# Patient Record
Sex: MALE | Race: OTHER | Employment: FULL TIME | URBAN - METROPOLITAN AREA
[De-identification: names, ages, dates, MRNs, and addresses within clinical notes are randomized per-mention and may not be internally consistent; named-entity substitution may affect disease eponyms.]

---

## 2017-10-17 ENCOUNTER — OFFICE VISIT (OUTPATIENT)
Dept: FAMILY MEDICINE CLINIC | Facility: CLINIC | Age: 27
End: 2017-10-17

## 2017-10-17 VITALS
HEART RATE: 83 BPM | SYSTOLIC BLOOD PRESSURE: 110 MMHG | WEIGHT: 201 LBS | BODY MASS INDEX: 27.22 KG/M2 | RESPIRATION RATE: 20 BRPM | DIASTOLIC BLOOD PRESSURE: 80 MMHG | HEIGHT: 72 IN | OXYGEN SATURATION: 99 % | TEMPERATURE: 98 F

## 2017-10-17 DIAGNOSIS — L30.9 DERMATITIS: Primary | ICD-10-CM

## 2017-10-17 PROCEDURE — 99212 OFFICE O/P EST SF 10 MIN: CPT | Performed by: NURSE PRACTITIONER

## 2017-10-17 RX ORDER — METHYLPREDNISOLONE 4 MG/1
TABLET ORAL
Qty: 1 KIT | Refills: 0 | Status: SHIPPED | OUTPATIENT
Start: 2017-10-17 | End: 2017-10-28 | Stop reason: ALTCHOICE

## 2017-10-17 NOTE — PROGRESS NOTES
CHIEF COMPLAINT:   Patient presents with:  Derm Problem: rash on RT knee x 2 days, rash is red and itchy         HPI:   John Villarreal is a 24year old male who presents for evaluation of a rash. Per patient rash started in the past 2 days.  Rash has been LUNGS: Clear to auscultation bilaterally. No wheezing, rhonchi, or rales. No diminished breath sounds. No increased work of breathing. CARDIO: RRR without murmur  JOINTS: normal ROM of all extremities  LYMPH: No cervical lymphadenopathy.      ASSESSMENT · Do your best not to scratch. Clip fingernails short, especially in young children, to reduce skin damage if scratching does occur. · Use moisturizing skin lotion instead of scratching your dry skin. · Use sunscreen whenever going out into direct sun. · You were told that you have resistant bacteria (MRSA) on your skin. Date Last Reviewed: 5/12/2015  © 2253-7669 [employer]. [address] [facility] [place], [address] [place] [address]. All rights reserved.  This information is not intended as a substitute f

## 2017-10-17 NOTE — PATIENT INSTRUCTIONS
Benadryl at night  Follow up for worsening symptoms or no improvement    Self-Care for Skin Rashes     Pat your skin dry. Do not rub. When your skin reacts to a substance your body is sensitive to, it can cause a rash.  You can treat most rashes at [facility] · Using over-the-counter hydrocortisone cream on small rashes may help reduce swelling and itching  · Most over-the-counter antifungal medicines can treat athlete’s foot and many other fungal infections of the skin.   Check with your healthcare provider  Ca

## 2017-10-26 ENCOUNTER — HOSPITAL ENCOUNTER (OUTPATIENT)
Age: 27
Discharge: HOME | End: 2017-10-26
Payer: COMMERCIAL

## 2017-10-26 VITALS
OXYGEN SATURATION: 98 % | HEART RATE: 90 BPM | TEMPERATURE: 98 F | DIASTOLIC BLOOD PRESSURE: 81 MMHG | RESPIRATION RATE: 18 BRPM | SYSTOLIC BLOOD PRESSURE: 143 MMHG

## 2017-10-26 DIAGNOSIS — L30.9 DERMATITIS: Primary | ICD-10-CM

## 2017-10-26 PROCEDURE — 99213 OFFICE O/P EST LOW 20 MIN: CPT

## 2017-10-26 PROCEDURE — 99204 OFFICE O/P NEW MOD 45 MIN: CPT

## 2017-10-26 NOTE — ED INITIAL ASSESSMENT (HPI)
C/o bilateral lower leg rashes onset 10/15/2017, seen at walk-in clinic ([facility]) on 1017/2017 dx with dermatitis was prescribed Medrol dose pack and Hydrocortisone and taking Benadryl. Not any better and got worse last night and still itching.

## 2017-10-26 NOTE — ED PROVIDER NOTES
Patient Seen in: [facility]    History   Patient presents with:  Rash Skin Problem (integumentary)    Stated Complaint: 11 days rash on leg    HPI    Mukesh Solano is a 22-year-old male who presents for evaluation of a rash to his right and atraumatic. Cardiovascular: Normal rate, regular rhythm, normal heart sounds and intact distal pulses. Pulmonary/Chest: Effort normal and breath sounds normal.   Neurological: He is alert and oriented to person, place, and time.    Skin: Skin is wa [facility] [address]  [phone]      If symptoms worsen    Qing Costello MD  [address] [address]  [address] [facility] [id number] [phone]            Medications Prescribed:  Discharge Medication List as of 10/26/2017 10:27 AM    START taking these medications

## 2017-10-28 ENCOUNTER — OFFICE VISIT (OUTPATIENT)
Dept: INTERNAL MEDICINE CLINIC | Facility: CLINIC | Age: 27
End: 2017-10-28

## 2017-10-28 VITALS
HEIGHT: 72 IN | WEIGHT: 201 LBS | RESPIRATION RATE: 17 BRPM | SYSTOLIC BLOOD PRESSURE: 126 MMHG | BODY MASS INDEX: 27.22 KG/M2 | HEART RATE: 108 BPM | OXYGEN SATURATION: 98 % | DIASTOLIC BLOOD PRESSURE: 84 MMHG

## 2017-10-28 DIAGNOSIS — L30.9 DERMATITIS: Primary | ICD-10-CM

## 2017-10-28 PROCEDURE — 99213 OFFICE O/P EST LOW 20 MIN: CPT | Performed by: INTERNAL MEDICINE

## 2017-10-28 NOTE — PROGRESS NOTES
Shaun Carcamo  1990 is a 24year old male.     Patient presents with:  Derm Problem      HPI:   Pt went  to the immediate care center for rash on the right knee was given medicines as noted in the records    Current Outpatient Prescriptions:  triamc

## 2018-04-24 ENCOUNTER — OFFICE VISIT (OUTPATIENT)
Dept: FAMILY MEDICINE CLINIC | Facility: CLINIC | Age: 28
End: 2018-04-24

## 2018-04-24 VITALS
SYSTOLIC BLOOD PRESSURE: 120 MMHG | OXYGEN SATURATION: 98 % | HEART RATE: 159 BPM | BODY MASS INDEX: 27.22 KG/M2 | RESPIRATION RATE: 20 BRPM | WEIGHT: 201 LBS | DIASTOLIC BLOOD PRESSURE: 80 MMHG | HEIGHT: 72 IN | TEMPERATURE: 100 F

## 2018-04-24 DIAGNOSIS — J02.9 SORE THROAT: Primary | ICD-10-CM

## 2018-04-24 PROCEDURE — 99213 OFFICE O/P EST LOW 20 MIN: CPT | Performed by: NURSE PRACTITIONER

## 2018-04-24 PROCEDURE — 87880 STREP A ASSAY W/OPTIC: CPT | Performed by: NURSE PRACTITIONER

## 2018-04-24 PROCEDURE — 87081 CULTURE SCREEN ONLY: CPT | Performed by: NURSE PRACTITIONER

## 2018-04-24 NOTE — PROGRESS NOTES
HPI:   Dinh Dominguez is a 24year old male who presents with ill symptoms for  3  days. Patient reports sore throat, congestion, headache and body aches. Has tried Advil with mild intermittent relief. No current outpatient prescriptions on file.   No -     GRP A STREP CULT, THROAT; Future  -     GRP A STREP CULT, THROAT      Negative rapid strep discussed. Will send culture and advise of results. Self care discussed. Medication use and risk/benefit discussed.  Patient is advised to follow up with PCP if · Take the antibiotic medicine for the full 10 days, even if you feel better. This is very important to make sure the infection is treated.  It is also important to prevent drug-resistant germs from developing. If you were given an antibiotic shot, you won' ¨ Your child is 3years old or older and has a fever of 100.4°F (38°C) that continues for more than 3 days.   · New or worsening ear pain, sinus pain, or headache  · Painful lumps in the back of neck  · Stiff neck  · Lymph nodes are getting larger  · Inabil

## 2018-04-24 NOTE — PATIENT INSTRUCTIONS
Pharyngitis (Sore Throat), Report Pending    Pharyngitis (sore throat) is often due to a virus. It can also be caused by the streptococcus, or strep, bacterium, often called strep throat.  Both viral and strep infections can cause throat pain that is wors · For children: Use acetaminophen for fever, fussiness, or discomfort.  In infants older than 10months of age, you may use ibuprofen instead of acetaminophen. Talk with your child's healthcare provider before giving these medicines if your child has chronic · Signs of dehydration (very dark urine or no urine, sunken eyes, dizziness)  · Trouble breathing or noisy breathing  · Muffled voice  · New rash  · Child appears to be getting sicker  Date Last Reviewed: 4/13/2015  © 4292-2020 [employer].  8

## 2018-10-13 ENCOUNTER — OFFICE VISIT (OUTPATIENT)
Dept: INTERNAL MEDICINE CLINIC | Facility: CLINIC | Age: 28
End: 2018-10-13

## 2018-10-13 VITALS
TEMPERATURE: 98 F | HEART RATE: 70 BPM | RESPIRATION RATE: 13 BRPM | SYSTOLIC BLOOD PRESSURE: 122 MMHG | HEIGHT: 72 IN | BODY MASS INDEX: 26.82 KG/M2 | DIASTOLIC BLOOD PRESSURE: 74 MMHG | OXYGEN SATURATION: 96 % | WEIGHT: 198 LBS

## 2018-10-13 DIAGNOSIS — M26.622 TMJ TENDERNESS, LEFT: Primary | ICD-10-CM

## 2018-10-13 PROCEDURE — 99213 OFFICE O/P EST LOW 20 MIN: CPT | Performed by: INTERNAL MEDICINE

## 2018-10-13 RX ORDER — CYCLOBENZAPRINE HCL 5 MG
5 TABLET ORAL NIGHTLY
Qty: 15 TABLET | Refills: 0 | Status: SHIPPED | OUTPATIENT
Start: 2018-10-13 | End: 2018-10-23

## 2018-10-13 NOTE — PROGRESS NOTES
Amanda Cazares  1990 is a 34year old male who presents for upper respiratory symptoms    Patient presents with:  Jaw Pain: L side        HPI:   Pain left TMJ joint 1 week.   Had seen the dentist in August earlier unremarkable examination    Current Patient Instructions     TMJ Syndrome  The temporomandibular joint (TMJ) is the joint that connects your lower jaw to your head. You can feel it in front of your ears when you open and close your mouth.  TMJ disorders involve chronic or recurrent pain in th · You may take acetaminophen or ibuprofen for pain, unless you were given a different pain medicine.  (Note: If you have chronic liver or kidney disease or have ever had a stomach ulcer or gastrointestinal bleeding, talk with your healthcare provider before Follow up with your healthcare provider, or as advised. Further testing and additional treatment may be required. If changes to your lifestyle do not improve your symptoms, talk with your healthcare provider about other available therapies.  These include b Medicines may be prescribed to treat TMD. Others may be available over the counter. The medicine type and dosage will depend on the problem you have. For your safety, tell your healthcare provider if you are currently taking any medicines.  Also mention any · A self-care program helps you treat and manage symptoms on your own. Your program may include exercises. It may also include using ice and heat to relieve pain. · Gentle manipulation reduces pain and restores range of motion.  The healthcare provider use

## 2018-10-13 NOTE — PATIENT INSTRUCTIONS
TMJ Syndrome  The temporomandibular joint (TMJ) is the joint that connects your lower jaw to your head. You can feel it in front of your ears when you open and close your mouth. TMJ disorders involve chronic or recurrent pain in the joint.  When treated, · You may take acetaminophen or ibuprofen for pain, unless you were given a different pain medicine.  (Note: If you have chronic liver or kidney disease or have ever had a stomach ulcer or gastrointestinal bleeding, talk with your healthcare provider before Follow up with your healthcare provider, or as advised. Further testing and additional treatment may be required. If changes to your lifestyle do not improve your symptoms, talk with your healthcare provider about other available therapies.  These include b Medicines may be prescribed to treat TMD. Others may be available over the counter. The medicine type and dosage will depend on the problem you have. For your safety, tell your healthcare provider if you are currently taking any medicines.  Also mention any · A self-care program helps you treat and manage symptoms on your own. Your program may include exercises. It may also include using ice and heat to relieve pain. · Gentle manipulation reduces pain and restores range of motion.  The healthcare provider use

## 2019-01-18 ENCOUNTER — OFFICE VISIT (OUTPATIENT)
Dept: FAMILY MEDICINE CLINIC | Facility: CLINIC | Age: 29
End: 2019-01-18

## 2019-01-18 VITALS
SYSTOLIC BLOOD PRESSURE: 118 MMHG | TEMPERATURE: 98 F | HEIGHT: 72 IN | RESPIRATION RATE: 18 BRPM | BODY MASS INDEX: 24.92 KG/M2 | HEART RATE: 71 BPM | WEIGHT: 184 LBS | OXYGEN SATURATION: 98 % | DIASTOLIC BLOOD PRESSURE: 70 MMHG

## 2019-01-18 DIAGNOSIS — H69.83 DYSFUNCTION OF BOTH EUSTACHIAN TUBES: ICD-10-CM

## 2019-01-18 DIAGNOSIS — J06.9 VIRAL URI: Primary | ICD-10-CM

## 2019-01-18 DIAGNOSIS — R09.82 POST-NASAL DRIP: ICD-10-CM

## 2019-01-18 PROCEDURE — 99213 OFFICE O/P EST LOW 20 MIN: CPT | Performed by: NURSE PRACTITIONER

## 2019-01-18 RX ORDER — FLUTICASONE PROPIONATE 50 MCG
SPRAY, SUSPENSION (ML) NASAL
Qty: 1 BOTTLE | Refills: 0 | Status: SHIPPED | OUTPATIENT
Start: 2019-01-18 | End: 2019-08-10

## 2019-01-18 NOTE — PROGRESS NOTES
CHIEF COMPLAINT:   Patient presents with:  Sinus Problem: s/s for 4 days. OTC meds taken  Fever: on/off  Ear Pain: Left>Right ear pain      HPI:   Emily Wilks is a 34year old male who presents for upper respiratory symptoms for  4 days.  Patient report NECK: Supple, non-tender  LUNGS: clear to auscultation bilaterally; no wheezes, rales, or rhonchi, no diminished breath sounds. Breathing is non labored.   CARDIO: RRR without murmur  GI: active BS's x4,no masses, hepatosplenomegaly, or tenderness on direc OME can happen when you have a cold if congestion blocks the passage that drains the middle ear. This passage is called the eustachian tube. OME may also occur with nasal allergies or after a bacterial middle ear infection.     The pain or discomfort may co · Antihistamines may help if you are also having allergy symptoms. · You may use medicines such as guaifenesin to thin mucus and promote drainage.   Follow-up care  Follow up with your healthcare provider or as advised if you are not feeling better after 3

## 2019-01-18 NOTE — PATIENT INSTRUCTIONS
· Switch back to Mucinex D. Start flonase nasal spray.   · Over-the-counter acetaminophen/Ibuprofen according to package instructions as needed for fever or pain   · Drink a lot of fluids; increase rest  · Use cool mist humidifier  · Over the counter salin over-the-counter medicine as directed to control pain, unless another medicine was prescribed. If you have chronic liver or kidney disease or ever had a stomach ulcer or GI bleeding, talk with your doctor before using these medicines.  Aspirin should never

## 2019-08-10 ENCOUNTER — OFFICE VISIT (OUTPATIENT)
Dept: INTERNAL MEDICINE CLINIC | Facility: CLINIC | Age: 29
End: 2019-08-10

## 2019-08-10 VITALS
BODY MASS INDEX: 25.48 KG/M2 | OXYGEN SATURATION: 98 % | SYSTOLIC BLOOD PRESSURE: 120 MMHG | RESPIRATION RATE: 12 BRPM | HEART RATE: 68 BPM | TEMPERATURE: 99 F | HEIGHT: 71 IN | DIASTOLIC BLOOD PRESSURE: 82 MMHG | WEIGHT: 182 LBS

## 2019-08-10 DIAGNOSIS — Z23 NEED FOR TETANUS BOOSTER: ICD-10-CM

## 2019-08-10 DIAGNOSIS — L65.9 HAIR LOSS: ICD-10-CM

## 2019-08-10 DIAGNOSIS — Z00.00 ENCOUNTER FOR PREVENTATIVE ADULT HEALTH CARE EXAMINATION: Primary | ICD-10-CM

## 2019-08-10 LAB
ALBUMIN SERPL-MCNC: 4.2 G/DL (ref 3.4–5)
ALBUMIN/GLOB SERPL: 1.1 {RATIO} (ref 1–2)
ALP LIVER SERPL-CCNC: 78 U/L (ref 45–117)
ALT SERPL-CCNC: 24 U/L (ref 16–61)
ANION GAP SERPL CALC-SCNC: 6 MMOL/L (ref 0–18)
AST SERPL-CCNC: 16 U/L (ref 15–37)
BASOPHILS # BLD AUTO: 0.03 X10(3) UL (ref 0–0.2)
BASOPHILS NFR BLD AUTO: 0.6 %
BILIRUB SERPL-MCNC: 0.4 MG/DL (ref 0.1–2)
BUN BLD-MCNC: 18 MG/DL (ref 7–18)
BUN/CREAT SERPL: 15.7 (ref 10–20)
CALCIUM BLD-MCNC: 8.5 MG/DL (ref 8.5–10.1)
CHLORIDE SERPL-SCNC: 107 MMOL/L (ref 98–112)
CHOLEST SMN-MCNC: 157 MG/DL (ref ?–200)
CO2 SERPL-SCNC: 27 MMOL/L (ref 21–32)
CREAT BLD-MCNC: 1.15 MG/DL (ref 0.7–1.3)
DEPRECATED RDW RBC AUTO: 47.1 FL (ref 35.1–46.3)
EOSINOPHIL # BLD AUTO: 0.15 X10(3) UL (ref 0–0.7)
EOSINOPHIL NFR BLD AUTO: 3.2 %
ERYTHROCYTE [DISTWIDTH] IN BLOOD BY AUTOMATED COUNT: 13.5 % (ref 11–15)
EST. AVERAGE GLUCOSE BLD GHB EST-MCNC: 111 MG/DL (ref 68–126)
GLOBULIN PLAS-MCNC: 3.8 G/DL (ref 2.8–4.4)
GLUCOSE BLD-MCNC: 84 MG/DL (ref 70–99)
HBA1C MFR BLD HPLC: 5.5 % (ref ?–5.7)
HCT VFR BLD AUTO: 49.5 % (ref 39–53)
HDLC SERPL-MCNC: 47 MG/DL (ref 40–59)
HGB BLD-MCNC: 15.3 G/DL (ref 13–17.5)
IMM GRANULOCYTES # BLD AUTO: 0.01 X10(3) UL (ref 0–1)
IMM GRANULOCYTES NFR BLD: 0.2 %
LDLC SERPL CALC-MCNC: 90 MG/DL (ref ?–100)
LYMPHOCYTES # BLD AUTO: 1.7 X10(3) UL (ref 1–4)
LYMPHOCYTES NFR BLD AUTO: 36 %
M PROTEIN MFR SERPL ELPH: 8 G/DL (ref 6.4–8.2)
MCH RBC QN AUTO: 29.1 PG (ref 26–34)
MCHC RBC AUTO-ENTMCNC: 30.9 G/DL (ref 31–37)
MCV RBC AUTO: 94.1 FL (ref 80–100)
MONOCYTES # BLD AUTO: 0.37 X10(3) UL (ref 0.1–1)
MONOCYTES NFR BLD AUTO: 7.8 %
NEUTROPHILS # BLD AUTO: 2.46 X10 (3) UL (ref 1.5–7.7)
NEUTROPHILS # BLD AUTO: 2.46 X10(3) UL (ref 1.5–7.7)
NEUTROPHILS NFR BLD AUTO: 52.2 %
NONHDLC SERPL-MCNC: 110 MG/DL (ref ?–130)
OSMOLALITY SERPL CALC.SUM OF ELEC: 291 MOSM/KG (ref 275–295)
PLATELET # BLD AUTO: 163 10(3)UL (ref 150–450)
POTASSIUM SERPL-SCNC: 4.3 MMOL/L (ref 3.5–5.1)
RBC # BLD AUTO: 5.26 X10(6)UL (ref 4.3–5.7)
SODIUM SERPL-SCNC: 140 MMOL/L (ref 136–145)
T4 FREE SERPL-MCNC: 1.1 NG/DL (ref 0.8–1.7)
TRIGL SERPL-MCNC: 101 MG/DL (ref 30–149)
TSI SER-ACNC: 4.98 MIU/ML (ref 0.36–3.74)
VIT D+METAB SERPL-MCNC: 14 NG/ML (ref 30–100)
VLDLC SERPL CALC-MCNC: 20 MG/DL (ref 0–30)
WBC # BLD AUTO: 4.7 X10(3) UL (ref 4–11)

## 2019-08-10 PROCEDURE — 84439 ASSAY OF FREE THYROXINE: CPT | Performed by: INTERNAL MEDICINE

## 2019-08-10 PROCEDURE — 99395 PREV VISIT EST AGE 18-39: CPT | Performed by: INTERNAL MEDICINE

## 2019-08-10 PROCEDURE — 80061 LIPID PANEL: CPT | Performed by: INTERNAL MEDICINE

## 2019-08-10 PROCEDURE — 90715 TDAP VACCINE 7 YRS/> IM: CPT | Performed by: INTERNAL MEDICINE

## 2019-08-10 PROCEDURE — 85025 COMPLETE CBC W/AUTO DIFF WBC: CPT | Performed by: INTERNAL MEDICINE

## 2019-08-10 PROCEDURE — 82306 VITAMIN D 25 HYDROXY: CPT | Performed by: INTERNAL MEDICINE

## 2019-08-10 PROCEDURE — 83036 HEMOGLOBIN GLYCOSYLATED A1C: CPT | Performed by: INTERNAL MEDICINE

## 2019-08-10 PROCEDURE — 84443 ASSAY THYROID STIM HORMONE: CPT | Performed by: INTERNAL MEDICINE

## 2019-08-10 PROCEDURE — 80053 COMPREHEN METABOLIC PANEL: CPT | Performed by: INTERNAL MEDICINE

## 2019-08-10 PROCEDURE — 90471 IMMUNIZATION ADMIN: CPT | Performed by: INTERNAL MEDICINE

## 2019-08-10 NOTE — PROGRESS NOTES
Haritha Álvarez is a 34year old male. HPI:   Patient presents with:  CPX    Patient presents for CPX/wellness examination. Diet: Focusing on a healthy diet, watching calories, eating more greens, leafy vegetables. Exercise: Exercise 3-4 times a week. oriented, well developed, well nourished,in no apparent distress  SKIN: no rashes,no suspicious lesions, no scalp lesions noted  HEENT: atraumatic, PERRLA, EOMI, normal lid and conjunctiva, normal external canals and tympanic membranes bilaterally  NECK: s

## 2019-08-10 NOTE — PATIENT INSTRUCTIONS
- We will check screening blood work today  - Tetanus booster shot given today (TDaP). You will need your next booster shot in 10 years. - Keep up the good work with diet and exercise    It was a pleasure seeing you in the clinic today.   Thank you for ch

## 2019-08-11 DIAGNOSIS — E03.8 SUBCLINICAL HYPOTHYROIDISM: Primary | ICD-10-CM

## 2019-08-11 DIAGNOSIS — E55.9 VITAMIN D DEFICIENCY: ICD-10-CM

## 2019-08-11 PROBLEM — L65.9 HAIR LOSS: Status: ACTIVE | Noted: 2019-08-11

## 2019-08-11 RX ORDER — LEVOTHYROXINE SODIUM 0.03 MG/1
25 TABLET ORAL
Qty: 90 TABLET | Refills: 0 | Status: SHIPPED | OUTPATIENT
Start: 2019-08-11 | End: 2020-05-15

## 2019-08-11 RX ORDER — ERGOCALCIFEROL 1.25 MG/1
50000 CAPSULE ORAL WEEKLY
Qty: 12 CAPSULE | Refills: 0 | Status: SHIPPED | OUTPATIENT
Start: 2019-08-11 | End: 2020-05-15 | Stop reason: ALTCHOICE

## 2019-11-15 ENCOUNTER — LAB ENCOUNTER (OUTPATIENT)
Dept: LAB | Age: 29
End: 2019-11-15
Attending: INTERNAL MEDICINE
Payer: COMMERCIAL

## 2019-11-15 ENCOUNTER — OFFICE VISIT (OUTPATIENT)
Dept: INTERNAL MEDICINE CLINIC | Facility: CLINIC | Age: 29
End: 2019-11-15

## 2019-11-15 VITALS
HEIGHT: 72 IN | BODY MASS INDEX: 25.36 KG/M2 | RESPIRATION RATE: 16 BRPM | HEART RATE: 75 BPM | DIASTOLIC BLOOD PRESSURE: 70 MMHG | SYSTOLIC BLOOD PRESSURE: 106 MMHG | OXYGEN SATURATION: 99 % | WEIGHT: 187.25 LBS | TEMPERATURE: 98 F

## 2019-11-15 DIAGNOSIS — R76.11 ABNORMAL REACTION TO TUBERCULIN TEST: ICD-10-CM

## 2019-11-15 DIAGNOSIS — E55.9 VITAMIN D DEFICIENCY: ICD-10-CM

## 2019-11-15 DIAGNOSIS — L30.9 DERMATITIS: ICD-10-CM

## 2019-11-15 DIAGNOSIS — R23.8 SCALP IRRITATION: Primary | ICD-10-CM

## 2019-11-15 DIAGNOSIS — E03.8 SUBCLINICAL HYPOTHYROIDISM: ICD-10-CM

## 2019-11-15 DIAGNOSIS — Z23 NEED FOR INFLUENZA VACCINATION: ICD-10-CM

## 2019-11-15 DIAGNOSIS — Z11.1 SCREENING FOR TUBERCULOSIS: ICD-10-CM

## 2019-11-15 PROBLEM — M26.622 TMJ TENDERNESS, LEFT: Status: RESOLVED | Noted: 2018-10-13 | Resolved: 2019-11-15

## 2019-11-15 PROCEDURE — 82306 VITAMIN D 25 HYDROXY: CPT

## 2019-11-15 PROCEDURE — 36415 COLL VENOUS BLD VENIPUNCTURE: CPT

## 2019-11-15 PROCEDURE — 84439 ASSAY OF FREE THYROXINE: CPT

## 2019-11-15 PROCEDURE — 86480 TB TEST CELL IMMUN MEASURE: CPT

## 2019-11-15 PROCEDURE — 90686 IIV4 VACC NO PRSV 0.5 ML IM: CPT | Performed by: INTERNAL MEDICINE

## 2019-11-15 PROCEDURE — 90471 IMMUNIZATION ADMIN: CPT | Performed by: INTERNAL MEDICINE

## 2019-11-15 PROCEDURE — 84443 ASSAY THYROID STIM HORMONE: CPT

## 2019-11-15 PROCEDURE — 99214 OFFICE O/P EST MOD 30 MIN: CPT | Performed by: INTERNAL MEDICINE

## 2019-11-15 NOTE — PROGRESS NOTES
Ty Ohara is a 34year old male. HPI:   Patient presents with:  Medication Follow-Up  Other: TB testing and flu shot     Patient presents to discuss several issues. Subclinical hypothyroidism - improved energy since starting levothyroxine.   Vitamin kg)  08/10/19 : 182 lb (82.6 kg)  01/18/19 : 184 lb (83.5 kg)  10/13/18 : 198 lb (89.8 kg)  04/24/18 : 201 lb (91.2 kg)  10/28/17 : 201 lb (91.2 kg)    EXAM:   /70 (BP Location: Left arm, Patient Position: Sitting, Cuff Size: large)   Pulse 75   Temp Team:  Austin Abernathy MD as PCP - General  The patient indicates understanding of these issues and agrees to the plan.   The patient is asked to return to clinic in 6-12 months with Dr. Brittni Thomas MD for follow up on chronic issues, or earlier if acute

## 2019-11-15 NOTE — PATIENT INSTRUCTIONS
- We will check repeat thyroid and vitamin D labs today. Based on results we will decide whether we need to continue medications.   - We will check a TB blood test.    - Flu shot given today   - Follow up with Dr. Abigail Martins for scalp/skin

## 2020-04-07 ENCOUNTER — TELEPHONE (OUTPATIENT)
Dept: INTERNAL MEDICINE CLINIC | Facility: CLINIC | Age: 30
End: 2020-04-07

## 2020-04-07 DIAGNOSIS — R50.9 FEVER, UNSPECIFIED FEVER CAUSE: Primary | ICD-10-CM

## 2020-04-07 DIAGNOSIS — J02.9 SORE THROAT: ICD-10-CM

## 2020-04-07 PROCEDURE — 99213 OFFICE O/P EST LOW 20 MIN: CPT | Performed by: INTERNAL MEDICINE

## 2020-04-07 NOTE — TELEPHONE ENCOUNTER
Virtual/Telephone Check-In    Emily Wilks verbally consents to a [employer] on 04/07/20. Patient understands and accepts financial responsibility for any deductible, co-insurance and/or co-pays associated with this service.     D

## 2020-04-07 NOTE — TELEPHONE ENCOUNTER
Requesting jeff for sore throat says he had a fever of 101.4 yesterday and today 97.9  - Insurance information confirmed/updated  - Patient informed that they may receive a call from a blocked/unavailable number.  - Patient aware that a charge may apply if

## 2020-04-08 ENCOUNTER — TELEPHONE (OUTPATIENT)
Dept: INTERNAL MEDICINE CLINIC | Facility: CLINIC | Age: 30
End: 2020-04-08

## 2020-04-08 ENCOUNTER — LAB ENCOUNTER (OUTPATIENT)
Dept: LAB | Facility: HOSPITAL | Age: 30
End: 2020-04-08
Attending: INTERNAL MEDICINE
Payer: COMMERCIAL

## 2020-04-08 DIAGNOSIS — J02.9 SORE THROAT: ICD-10-CM

## 2020-04-08 DIAGNOSIS — R50.9 FEVER, UNSPECIFIED FEVER CAUSE: ICD-10-CM

## 2020-04-08 RX ORDER — CODEINE PHOSPHATE AND GUAIFENESIN 10; 100 MG/5ML; MG/5ML
5 SOLUTION ORAL EVERY 6 HOURS PRN
Qty: 240 ML | Refills: 0 | Status: SHIPPED | OUTPATIENT
Start: 2020-04-08 | End: 2020-04-18

## 2020-04-08 RX ORDER — AMOXICILLIN AND CLAVULANATE POTASSIUM 500; 125 MG/1; MG/1
1 TABLET, FILM COATED ORAL 2 TIMES DAILY
Qty: 20 TABLET | Refills: 0 | Status: SHIPPED | OUTPATIENT
Start: 2020-04-08 | End: 2020-04-18

## 2020-04-08 NOTE — TELEPHONE ENCOUNTER
Pt wants to a follow up on yesterdays 4/7 telephone visit.      - Insurance information confirmed/updated  - Patient informed that they may receive a call from a blocked/unavailable number.  - Patient aware that a charge may apply if call is converted to a

## 2020-05-07 ENCOUNTER — VIRTUAL PHONE E/M (OUTPATIENT)
Dept: INTERNAL MEDICINE CLINIC | Facility: CLINIC | Age: 30
End: 2020-05-07

## 2020-05-07 DIAGNOSIS — R05.9 COUGH: Primary | ICD-10-CM

## 2020-05-07 PROCEDURE — 99213 OFFICE O/P EST LOW 20 MIN: CPT | Performed by: INTERNAL MEDICINE

## 2020-05-07 RX ORDER — PREDNISONE 1 MG/1
TABLET ORAL
Qty: 42 TABLET | Refills: 0 | Status: SHIPPED | OUTPATIENT
Start: 2020-05-07 | End: 2020-05-15 | Stop reason: ALTCHOICE

## 2020-05-07 RX ORDER — ALBUTEROL SULFATE 90 UG/1
2 AEROSOL, METERED RESPIRATORY (INHALATION) EVERY 6 HOURS PRN
Qty: 2 INHALER | Refills: 0 | Status: SHIPPED | OUTPATIENT
Start: 2020-05-07 | End: 2020-05-08

## 2020-05-07 NOTE — PROGRESS NOTES
Virtual Telephone Check-In    Kari Darrell verbally consents to a Virtual/Telephone Check-In visit on 05/07/20. Patient understands and accepts financial responsibility for any deductible, co-insurance and/or co-pays associated with this service.     D

## 2020-05-08 DIAGNOSIS — R05.9 COUGH: ICD-10-CM

## 2020-05-08 RX ORDER — ALBUTEROL SULFATE 90 UG/1
2 AEROSOL, METERED RESPIRATORY (INHALATION) EVERY 6 HOURS PRN
Qty: 3 INHALER | Refills: 0 | Status: SHIPPED | OUTPATIENT
Start: 2020-05-08 | End: 2020-08-06

## 2020-05-15 ENCOUNTER — LAB ENCOUNTER (OUTPATIENT)
Dept: LAB | Age: 30
End: 2020-05-15
Attending: INTERNAL MEDICINE
Payer: COMMERCIAL

## 2020-05-15 ENCOUNTER — OFFICE VISIT (OUTPATIENT)
Dept: INTERNAL MEDICINE CLINIC | Facility: CLINIC | Age: 30
End: 2020-05-15

## 2020-05-15 VITALS
BODY MASS INDEX: 25.84 KG/M2 | WEIGHT: 190.75 LBS | HEART RATE: 80 BPM | DIASTOLIC BLOOD PRESSURE: 88 MMHG | RESPIRATION RATE: 12 BRPM | SYSTOLIC BLOOD PRESSURE: 130 MMHG | TEMPERATURE: 98 F | HEIGHT: 72 IN

## 2020-05-15 DIAGNOSIS — R05.9 COUGH: ICD-10-CM

## 2020-05-15 DIAGNOSIS — R53.83 FATIGUE, UNSPECIFIED TYPE: ICD-10-CM

## 2020-05-15 DIAGNOSIS — E03.9 HYPOTHYROIDISM, UNSPECIFIED TYPE: ICD-10-CM

## 2020-05-15 DIAGNOSIS — R53.83 FATIGUE, UNSPECIFIED TYPE: Primary | ICD-10-CM

## 2020-05-15 PROCEDURE — 36415 COLL VENOUS BLD VENIPUNCTURE: CPT

## 2020-05-15 PROCEDURE — 80053 COMPREHEN METABOLIC PANEL: CPT

## 2020-05-15 PROCEDURE — 84439 ASSAY OF FREE THYROXINE: CPT

## 2020-05-15 PROCEDURE — 99214 OFFICE O/P EST MOD 30 MIN: CPT | Performed by: INTERNAL MEDICINE

## 2020-05-15 PROCEDURE — 85025 COMPLETE CBC W/AUTO DIFF WBC: CPT

## 2020-05-15 PROCEDURE — 84443 ASSAY THYROID STIM HORMONE: CPT

## 2020-05-15 PROCEDURE — 85652 RBC SED RATE AUTOMATED: CPT

## 2020-05-15 PROCEDURE — 82746 ASSAY OF FOLIC ACID SERUM: CPT

## 2020-05-15 PROCEDURE — 82607 VITAMIN B-12: CPT

## 2020-05-15 RX ORDER — PREDNISONE 1 MG/1
TABLET ORAL
Qty: 36 TABLET | Refills: 0 | Status: SHIPPED | OUTPATIENT
Start: 2020-05-15 | End: 2020-05-29 | Stop reason: ALTCHOICE

## 2020-05-15 RX ORDER — MULTIVIT-MIN/IRON/FOLIC ACID/K 18-600-40
1 CAPSULE ORAL DAILY
COMMUNITY
End: 2020-08-22 | Stop reason: ALTCHOICE

## 2020-05-15 NOTE — PROGRESS NOTES
Khanh Eli  1990 is a 34year old male.     Patient presents with:  Fatigue  Concentration      HPI:   Feels drained for the last few months was placed on replacement thyroid which he discontinued in 2019  Also does have some residual co appetite none. Genitourinary:   Patient denies polyuria, frequent night time urination. Neurologic:   Headache denies. Loss of sensation in specific body area denies. Sleep problems none. Trouble with balance denies. Trouble with coordination denies.  Ophelia Johnson REUBEN (AUTOMATED);  Future    Hypothyroidism, unspecified type  -     TSH+FREE T4; Future    Cough  -     predniSONE 5 MG Oral Tab; Prednisone 5 mg tabs     6 tabs  for 2 days   5 tabs for 2 days   4 tabs  for 2 days   2 tabs  for 2 days   1 tab  for 2

## 2020-05-18 ENCOUNTER — TELEPHONE (OUTPATIENT)
Dept: INTERNAL MEDICINE CLINIC | Facility: CLINIC | Age: 30
End: 2020-05-18

## 2020-05-18 DIAGNOSIS — E03.9 HYPOTHYROIDISM, UNSPECIFIED TYPE: Primary | ICD-10-CM

## 2020-05-18 RX ORDER — LEVOTHYROXINE SODIUM 25 UG/1
25 CAPSULE ORAL DAILY
Qty: 30 CAPSULE | Refills: 3 | Status: SHIPPED | OUTPATIENT
Start: 2020-05-18 | End: 2020-06-17

## 2020-05-18 NOTE — TELEPHONE ENCOUNTER
Pt returned call for lab results - provided results per provider notes. Agreeable to start levothyroxine per provider rec. Verbalized understanding, no additional questions.   Appt scheduled for in office fol up     Future Appointments   Date Time Provider Spencer Jacob   5/29/2020  2:30 PM David Mortensen MD [facility] [place]

## 2020-05-29 ENCOUNTER — OFFICE VISIT (OUTPATIENT)
Dept: INTERNAL MEDICINE CLINIC | Facility: CLINIC | Age: 30
End: 2020-05-29

## 2020-05-29 VITALS
SYSTOLIC BLOOD PRESSURE: 116 MMHG | OXYGEN SATURATION: 99 % | DIASTOLIC BLOOD PRESSURE: 64 MMHG | WEIGHT: 192 LBS | HEIGHT: 72 IN | BODY MASS INDEX: 26.01 KG/M2 | TEMPERATURE: 98 F | HEART RATE: 76 BPM | RESPIRATION RATE: 16 BRPM

## 2020-05-29 DIAGNOSIS — R05.9 COUGH: Primary | ICD-10-CM

## 2020-05-29 DIAGNOSIS — E03.9 HYPOTHYROIDISM, UNSPECIFIED TYPE: ICD-10-CM

## 2020-05-29 PROCEDURE — 99213 OFFICE O/P EST LOW 20 MIN: CPT | Performed by: INTERNAL MEDICINE

## 2020-05-29 NOTE — PROGRESS NOTES
Dinh Dominguez JUAN 1990 is a 34year old male who presents for upper respiratory symptoms    Patient presents with:   Follow - Up        HPI:    cough Much better pretty much resolved    Current Outpatient Medications   Medication Sig Dispense Refill is asked to Return in about 3 months (around 8/29/2020), or cpx . Travis Salcido MD

## 2020-08-22 ENCOUNTER — OFFICE VISIT (OUTPATIENT)
Dept: INTERNAL MEDICINE CLINIC | Facility: CLINIC | Age: 30
End: 2020-08-22

## 2020-08-22 VITALS
OXYGEN SATURATION: 99 % | HEART RATE: 74 BPM | RESPIRATION RATE: 16 BRPM | WEIGHT: 191.19 LBS | TEMPERATURE: 98 F | SYSTOLIC BLOOD PRESSURE: 108 MMHG | HEIGHT: 71.5 IN | BODY MASS INDEX: 26.18 KG/M2 | DIASTOLIC BLOOD PRESSURE: 62 MMHG

## 2020-08-22 DIAGNOSIS — Z00.00 ROUTINE GENERAL MEDICAL EXAMINATION AT A HEALTH CARE FACILITY: Primary | ICD-10-CM

## 2020-08-22 DIAGNOSIS — F41.9 ANXIETY: ICD-10-CM

## 2020-08-22 LAB
BILIRUB UR QL STRIP.AUTO: NEGATIVE
CHOLEST SMN-MCNC: 175 MG/DL (ref ?–200)
CLARITY UR REFRACT.AUTO: CLEAR
COLOR UR AUTO: YELLOW
EST. AVERAGE GLUCOSE BLD GHB EST-MCNC: 114 MG/DL (ref 68–126)
GLUCOSE UR STRIP.AUTO-MCNC: NEGATIVE MG/DL
HBA1C MFR BLD HPLC: 5.6 % (ref ?–5.7)
HDLC SERPL-MCNC: 42 MG/DL (ref 40–59)
KETONES UR STRIP.AUTO-MCNC: NEGATIVE MG/DL
LDLC SERPL CALC-MCNC: 110 MG/DL (ref ?–100)
LEUKOCYTE ESTERASE UR QL STRIP.AUTO: NEGATIVE
NITRITE UR QL STRIP.AUTO: NEGATIVE
NONHDLC SERPL-MCNC: 133 MG/DL (ref ?–130)
PATIENT FASTING Y/N/NP: YES
PH UR STRIP.AUTO: 5 [PH] (ref 4.5–8)
PROT UR STRIP.AUTO-MCNC: NEGATIVE MG/DL
RBC UR QL AUTO: NEGATIVE
SP GR UR STRIP.AUTO: 1.02 (ref 1–1.03)
T4 FREE SERPL-MCNC: 1.2 NG/DL (ref 0.8–1.7)
TRIGL SERPL-MCNC: 114 MG/DL (ref 30–149)
TSI SER-ACNC: 2.9 MIU/ML (ref 0.36–3.74)
UROBILINOGEN UR STRIP.AUTO-MCNC: <2 MG/DL
VLDLC SERPL CALC-MCNC: 23 MG/DL (ref 0–30)

## 2020-08-22 PROCEDURE — 99395 PREV VISIT EST AGE 18-39: CPT | Performed by: INTERNAL MEDICINE

## 2020-08-22 PROCEDURE — 3008F BODY MASS INDEX DOCD: CPT | Performed by: INTERNAL MEDICINE

## 2020-08-22 PROCEDURE — G0438 PPPS, INITIAL VISIT: HCPCS | Performed by: INTERNAL MEDICINE

## 2020-08-22 PROCEDURE — 84439 ASSAY OF FREE THYROXINE: CPT | Performed by: INTERNAL MEDICINE

## 2020-08-22 PROCEDURE — 80061 LIPID PANEL: CPT | Performed by: INTERNAL MEDICINE

## 2020-08-22 PROCEDURE — 3074F SYST BP LT 130 MM HG: CPT | Performed by: INTERNAL MEDICINE

## 2020-08-22 PROCEDURE — 84443 ASSAY THYROID STIM HORMONE: CPT | Performed by: INTERNAL MEDICINE

## 2020-08-22 PROCEDURE — 81003 URINALYSIS AUTO W/O SCOPE: CPT | Performed by: INTERNAL MEDICINE

## 2020-08-22 PROCEDURE — 83036 HEMOGLOBIN GLYCOSYLATED A1C: CPT | Performed by: INTERNAL MEDICINE

## 2020-08-22 PROCEDURE — 3078F DIAST BP <80 MM HG: CPT | Performed by: INTERNAL MEDICINE

## 2020-08-22 RX ORDER — LEVOTHYROXINE SODIUM 0.03 MG/1
25 TABLET ORAL
COMMUNITY
End: 2020-09-15

## 2020-08-22 NOTE — PROGRESS NOTES
Karissa Rich JUAN 1990 is a 34year old male.   Patient presents with:  CPX      HPI:   cpx   Current Outpatient Medications   Medication Sig Dispense Refill   • Levothyroxine Sodium 25 MCG Oral Tab Take 25 mcg by mouth before breakfast.        Past Me urine, burning on urination, difficulty urinating, dysuria, urinary frequency , urinary incontinence/no history of kidney disease or genital abnormalities. no Dysuria. Nocturia None.    Musculoskeletal:   Patient denies arthritis , back pain, muscle weaknes penis.   SANDY: normal .   Penis: unremarkable, no penile discharge, no penile lesions. Testicles: unremarkable, normal-size, without masses, non tender. EXTREMITIES:   Clubbing: none. Cyanosis: absent . Edema: none. Pulses: present.    Tremors: no.

## 2020-09-14 NOTE — TELEPHONE ENCOUNTER
LEVOTHYROXINE 0.025MG (25MCG) TAB  Protocol Passed     LOV: 8/22/2020   RTC: none noted   Upcoming OV:none scheduled   Recent labs: TSH 8/22/2020   Written by Tavo Freeman MD on 8/24/2020  2:57 PM   [place] numbers are within acceptable limit

## 2020-09-15 RX ORDER — LEVOTHYROXINE SODIUM 0.03 MG/1
TABLET ORAL
Qty: 30 TABLET | Refills: 0 | Status: SHIPPED | OUTPATIENT
Start: 2020-09-15 | End: 2021-01-25

## 2021-01-23 ENCOUNTER — TELEPHONE (OUTPATIENT)
Dept: INTERNAL MEDICINE CLINIC | Facility: CLINIC | Age: 31
End: 2021-01-23

## 2021-01-23 NOTE — TELEPHONE ENCOUNTER
HPI: patient received COVID vaccine on Tuesday, 1/19th first dose at 5pm. Started to develop fevers of 101-102. Has tried advil which has not helped. Has not been exposed to COVID in past 14 days.  He gets routine COVID testing through work every Monday and

## 2021-01-25 ENCOUNTER — HOSPITAL ENCOUNTER (OUTPATIENT)
Age: 31
Discharge: HOME | End: 2021-01-25
Payer: COMMERCIAL

## 2021-01-25 ENCOUNTER — TELEPHONE (OUTPATIENT)
Dept: INTERNAL MEDICINE CLINIC | Facility: CLINIC | Age: 31
End: 2021-01-25

## 2021-01-25 VITALS
HEART RATE: 91 BPM | WEIGHT: 195 LBS | SYSTOLIC BLOOD PRESSURE: 140 MMHG | RESPIRATION RATE: 20 BRPM | HEIGHT: 72 IN | DIASTOLIC BLOOD PRESSURE: 80 MMHG | OXYGEN SATURATION: 100 % | BODY MASS INDEX: 26.41 KG/M2 | TEMPERATURE: 97 F

## 2021-01-25 DIAGNOSIS — J02.9 FEVER WITH SORE THROAT: ICD-10-CM

## 2021-01-25 DIAGNOSIS — R50.9 FEVER WITH SORE THROAT: ICD-10-CM

## 2021-01-25 DIAGNOSIS — R68.89 FLU-LIKE SYMPTOMS: Primary | ICD-10-CM

## 2021-01-25 LAB — POCT RAPID STREP: NEGATIVE

## 2021-01-25 PROCEDURE — 87081 CULTURE SCREEN ONLY: CPT | Performed by: PHYSICIAN ASSISTANT

## 2021-01-25 PROCEDURE — 99203 OFFICE O/P NEW LOW 30 MIN: CPT

## 2021-01-25 PROCEDURE — 87880 STREP A ASSAY W/OPTIC: CPT | Performed by: PHYSICIAN ASSISTANT

## 2021-01-25 PROCEDURE — 99214 OFFICE O/P EST MOD 30 MIN: CPT

## 2021-01-25 NOTE — ED PROVIDER NOTES
Patient Seen in: [facility]      History   Patient presents with:  Fever  Sore Throat    Stated Complaint: pt received vac last tues and has had symptoms since/tired nausea fevers sore t*    HPI/Subjective:   HPI    61-year-old male here occlusion. Right Ear: Tympanic membrane and external ear normal.      Left Ear: External ear normal.      Nose: Nose normal.      Mouth/Throat:      Lips: Pink.       Mouth: Mucous membranes are moist.      Pharynx: Posterior oropharyngeal erythema pre are addressed to the patient's satisfaction prior to discharge today. Previous conversations with PCP and charts were reviewed.                             Disposition and Plan     Clinical Impression:  Flu-like symptoms  (primary encounter diagnosis)  Fev

## 2021-01-25 NOTE — TELEPHONE ENCOUNTER
Patient calling requesting to speak to nurse. Patient received covid vaccine  On 1/19 first dose. Currently having body ache, fever (100.0) NO SOB. Has swollen tonsils and sore throat. Has been exposed to COVID in the past 14 days.  Had covid testing complete

## 2021-01-25 NOTE — ED INITIAL ASSESSMENT (HPI)
The patient is here for evaluation of symptoms post COVID vaccine. He received the [address] vaccine on 1/19/2021 to the left arm.  Wednesday he was tired and sore left arm, Thursday he had increased fatigue and a fever, and on Friday, Saturday, and Sun

## 2021-08-19 ENCOUNTER — HOSPITAL ENCOUNTER (OUTPATIENT)
Age: 31
Discharge: HOME | End: 2021-08-19
Attending: EMERGENCY MEDICINE
Payer: COMMERCIAL

## 2021-08-19 VITALS
OXYGEN SATURATION: 100 % | HEART RATE: 90 BPM | DIASTOLIC BLOOD PRESSURE: 58 MMHG | WEIGHT: 189 LBS | BODY MASS INDEX: 25.6 KG/M2 | TEMPERATURE: 98 F | RESPIRATION RATE: 12 BRPM | SYSTOLIC BLOOD PRESSURE: 120 MMHG | HEIGHT: 72 IN

## 2021-08-19 DIAGNOSIS — M26.622 ARTHRALGIA OF LEFT TEMPOROMANDIBULAR JOINT: Primary | ICD-10-CM

## 2021-08-19 PROCEDURE — 99213 OFFICE O/P EST LOW 20 MIN: CPT

## 2021-08-19 PROCEDURE — 99214 OFFICE O/P EST MOD 30 MIN: CPT

## 2021-08-19 RX ORDER — NAPROXEN 500 MG/1
500 TABLET ORAL 2 TIMES DAILY PRN
Qty: 20 TABLET | Refills: 0 | Status: SHIPPED | OUTPATIENT
Start: 2021-08-19 | End: 2021-08-26

## 2021-08-19 RX ORDER — CYCLOBENZAPRINE HCL 10 MG
10 TABLET ORAL NIGHTLY
Qty: 7 TABLET | Refills: 0 | Status: SHIPPED | OUTPATIENT
Start: 2021-08-19 | End: 2021-08-26

## 2021-08-19 NOTE — ED PROVIDER NOTES
Patient Seen in: [facility]      History   Patient presents with:  Jaw Pain    Stated Complaint: left side on face swollen,pain,no injury    HPI/Subjective:   HPI    82-year-old male presenting with left TMJ pain.   Started yesterday, diamond heart sounds. Pulmonary:      Effort: Pulmonary effort is normal.      Breath sounds: Normal breath sounds. Musculoskeletal:         General: Normal range of motion. Cervical back: Normal range of motion and neck supple.    Skin:     General: Skin

## 2021-11-16 ENCOUNTER — LAB ENCOUNTER (OUTPATIENT)
Dept: LAB | Age: 31
End: 2021-11-16
Attending: INTERNAL MEDICINE
Payer: COMMERCIAL

## 2021-11-16 ENCOUNTER — OFFICE VISIT (OUTPATIENT)
Dept: INTERNAL MEDICINE CLINIC | Facility: CLINIC | Age: 31
End: 2021-11-16

## 2021-11-16 VITALS
RESPIRATION RATE: 16 BRPM | DIASTOLIC BLOOD PRESSURE: 62 MMHG | HEART RATE: 74 BPM | OXYGEN SATURATION: 100 % | SYSTOLIC BLOOD PRESSURE: 102 MMHG | TEMPERATURE: 98 F | HEIGHT: 71 IN | BODY MASS INDEX: 25.59 KG/M2 | WEIGHT: 182.81 LBS

## 2021-11-16 DIAGNOSIS — Z00.00 LABORATORY EXAMINATION ORDERED AS PART OF A ROUTINE GENERAL MEDICAL EXAMINATION: ICD-10-CM

## 2021-11-16 DIAGNOSIS — Z00.00 ROUTINE GENERAL MEDICAL EXAMINATION AT A HEALTH CARE FACILITY: Primary | ICD-10-CM

## 2021-11-16 PROCEDURE — 3078F DIAST BP <80 MM HG: CPT | Performed by: INTERNAL MEDICINE

## 2021-11-16 PROCEDURE — 36415 COLL VENOUS BLD VENIPUNCTURE: CPT

## 2021-11-16 PROCEDURE — 85025 COMPLETE CBC W/AUTO DIFF WBC: CPT

## 2021-11-16 PROCEDURE — 81003 URINALYSIS AUTO W/O SCOPE: CPT

## 2021-11-16 PROCEDURE — 82306 VITAMIN D 25 HYDROXY: CPT

## 2021-11-16 PROCEDURE — 3008F BODY MASS INDEX DOCD: CPT | Performed by: INTERNAL MEDICINE

## 2021-11-16 PROCEDURE — G0438 PPPS, INITIAL VISIT: HCPCS | Performed by: INTERNAL MEDICINE

## 2021-11-16 PROCEDURE — 99395 PREV VISIT EST AGE 18-39: CPT | Performed by: INTERNAL MEDICINE

## 2021-11-16 PROCEDURE — 83036 HEMOGLOBIN GLYCOSYLATED A1C: CPT

## 2021-11-16 PROCEDURE — 3074F SYST BP LT 130 MM HG: CPT | Performed by: INTERNAL MEDICINE

## 2021-11-16 PROCEDURE — 80053 COMPREHEN METABOLIC PANEL: CPT

## 2021-11-16 PROCEDURE — 84443 ASSAY THYROID STIM HORMONE: CPT

## 2021-11-16 PROCEDURE — 84436 ASSAY OF TOTAL THYROXINE: CPT

## 2021-11-16 NOTE — PROGRESS NOTES
Shaun Carcamo  1990 is a 36year old male. Patient presents with:  Physical      HPI:   cpx   No current outpatient medications on file.       Past Medical History:   Diagnosis Date   • TMJ tenderness, left 10/13/2018      Social History:  Social H dysuria, urinary frequency , urinary incontinence/no history of kidney disease or genital abnormalities. no Dysuria. Nocturia None. Musculoskeletal:   Patient denies arthritis , back pain, muscle weakness . Joint pain none. Joint stiffness none.    Periph absent . Edema: none. Pulses: present. Tremors: no.   Varicose veins: absent .    MUSCULOSKELETAL:   Cervical spines: normal.   L-S spines: normal.   Lower extremity joints: normal.   Upper extremity joints: normal.   NEUROLOGICAL:   Babinski: negativ

## 2021-11-19 ENCOUNTER — LAB ENCOUNTER (OUTPATIENT)
Dept: LAB | Age: 31
End: 2021-11-19
Attending: INTERNAL MEDICINE
Payer: COMMERCIAL

## 2021-11-19 ENCOUNTER — TELEPHONE (OUTPATIENT)
Dept: INTERNAL MEDICINE CLINIC | Facility: CLINIC | Age: 31
End: 2021-11-19

## 2021-11-19 DIAGNOSIS — Z00.00 LABORATORY EXAMINATION ORDERED AS PART OF A ROUTINE GENERAL MEDICAL EXAMINATION: ICD-10-CM

## 2021-11-19 PROCEDURE — 80061 LIPID PANEL: CPT

## 2021-11-19 PROCEDURE — 36415 COLL VENOUS BLD VENIPUNCTURE: CPT

## 2021-11-19 RX ORDER — MULTIVIT-MIN/IRON/FOLIC ACID/K 18-600-40
4000 CAPSULE ORAL DAILY
COMMUNITY
Start: 2021-11-19

## 2021-11-22 ENCOUNTER — TELEPHONE (OUTPATIENT)
Dept: INTERNAL MEDICINE CLINIC | Facility: CLINIC | Age: 31
End: 2021-11-22

## 2021-11-22 DIAGNOSIS — R53.83 FATIGUE, UNSPECIFIED TYPE: Primary | ICD-10-CM

## 2021-11-22 DIAGNOSIS — E78.00 ELEVATED LDL CHOLESTEROL LEVEL: ICD-10-CM

## 2021-11-22 NOTE — TELEPHONE ENCOUNTER
----- Message from Manolo Rowell MD sent at 11/20/2021  8:53 AM CST -----  He can get his vitamin D checked in 3 months. Vitamin D by itself does not explain his fatigue.   If that is an issue we need to reevaluate him.  ----- Message -----  From: Ellyn

## 2022-04-23 NOTE — ED INITIAL ASSESSMENT (HPI)
Pt aox4. Pt c/o feeling anxious and over whelmed recently. Pt states in the last 6 months has made changes to life- weight loss, relationship issues and changes jobs. Pt states had palpitations/ chest pain yesterday from 8-12p intermittently. Pt states symptoms went away for 30 mins at time. Pt states unable to sleep last night, slept 2 hours. Pt has been seeing a therapist virtually and was recommended to see PCP for medications. Pt denies CP/Palpitations currently.

## 2022-05-25 ENCOUNTER — WALK IN (OUTPATIENT)
Dept: URGENT CARE | Age: 32
End: 2022-05-25

## 2022-05-25 VITALS
OXYGEN SATURATION: 97 % | DIASTOLIC BLOOD PRESSURE: 70 MMHG | SYSTOLIC BLOOD PRESSURE: 110 MMHG | TEMPERATURE: 97.2 F | RESPIRATION RATE: 18 BRPM | HEART RATE: 98 BPM

## 2022-05-25 DIAGNOSIS — U07.1 COVID-19 VIRUS INFECTION: Primary | ICD-10-CM

## 2022-05-25 PROBLEM — L65.9 HAIR LOSS: Status: ACTIVE | Noted: 2019-08-11

## 2022-05-25 PROBLEM — L30.9 DERMATITIS: Status: ACTIVE | Noted: 2017-10-28

## 2022-05-25 LAB
INTERNAL PROCEDURAL CONTROLS ACCEPTABLE: YES
SARS-COV+SARS-COV-2 AG RESP QL IA.RAPID: DETECTED

## 2022-05-25 PROCEDURE — 99203 OFFICE O/P NEW LOW 30 MIN: CPT | Performed by: NURSE PRACTITIONER

## 2022-05-25 PROCEDURE — 87426 SARSCOV CORONAVIRUS AG IA: CPT | Performed by: NURSE PRACTITIONER

## 2022-05-25 RX ORDER — FINASTERIDE 1 MG/1
1 TABLET, FILM COATED ORAL DAILY
COMMUNITY
Start: 2022-02-22

## 2022-08-04 ENCOUNTER — TELEPHONE (OUTPATIENT)
Dept: INTERNAL MEDICINE CLINIC | Facility: CLINIC | Age: 32
End: 2022-08-04
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
## 2022-08-04 NOTE — TELEPHONE ENCOUNTER
Pt called stating he got pricked by a dirt needle at work yesterday and his employer recommended him go see a doctor and get blood drawn. Nothing available with us, pt it wanting to know where we recommend he goes for this. Pt states he went the the [facility] walk in clinic and [place] but they stated they did not have the equitment to perform the blood test.     Pt would like to know where we rec he goes.

## 2022-08-04 NOTE — TELEPHONE ENCOUNTER
VM- post needle exposure labs pended, for approval  Patient wanting to avoid going back to immediate care. Spoke with patient via phone. 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 Was told by employer to contact PCP and/or go to immediate care for evaluation. Patient went to two immediate care centers and was told \"they don't have tubes needed to perform testing\". Spoke with VM- discussed lab work patient should get. Explained if patient unable to get via immediate care VM would order. Spoke with patient. Reviewed VM's recommendations.   Patient prefers avoiding going back to immediate care centers, as he was already at two, and neither helped him

## 2022-08-08 ENCOUNTER — LAB ENCOUNTER (OUTPATIENT)
Dept: LAB | Age: 32
End: 2022-08-08
Attending: INTERNAL MEDICINE
Payer: COMMERCIAL

## 2022-08-08 DIAGNOSIS — R53.83 FATIGUE, UNSPECIFIED TYPE: ICD-10-CM
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
## 2022-08-08 LAB
ALBUMIN SERPL-MCNC: 3.8 G/DL (ref 3.4–5)
ALP LIVER SERPL-CCNC: 65 U/L
ALT SERPL-CCNC: 38 U/L
AST SERPL-CCNC: 24 U/L (ref 15–37)
BILIRUB DIRECT SERPL-MCNC: <0.1 MG/DL (ref 0–0.2)
BILIRUB SERPL-MCNC: 0.2 MG/DL (ref 0.1–2)
HBV SURFACE AB SER QL: NONREACTIVE
HBV SURFACE AB SERPL IA-ACNC: <3.1 MIU/ML
HBV SURFACE AG SER-ACNC: <0.1 [IU]/L
HBV SURFACE AG SERPL QL IA: NONREACTIVE
HCV AB SERPL QL IA: NONREACTIVE
PROT SERPL-MCNC: 7.3 G/DL (ref 6.4–8.2)
VIT D+METAB SERPL-MCNC: 27.7 NG/ML (ref 30–100)

## 2022-08-08 PROCEDURE — 86803 HEPATITIS C AB TEST: CPT | Performed by: INTERNAL MEDICINE

## 2022-08-08 PROCEDURE — 87389 HIV-1 AG W/HIV-1&-2 AB AG IA: CPT | Performed by: INTERNAL MEDICINE

## 2022-08-08 PROCEDURE — 80076 HEPATIC FUNCTION PANEL: CPT | Performed by: INTERNAL MEDICINE

## 2022-08-08 PROCEDURE — 86706 HEP B SURFACE ANTIBODY: CPT | Performed by: INTERNAL MEDICINE

## 2022-08-08 PROCEDURE — 87340 HEPATITIS B SURFACE AG IA: CPT | Performed by: INTERNAL MEDICINE

## 2022-08-08 PROCEDURE — 82306 VITAMIN D 25 HYDROXY: CPT | Performed by: INTERNAL MEDICINE

## 2022-08-10 ENCOUNTER — LAB ENCOUNTER (OUTPATIENT)
Dept: LAB | Age: 32
End: 2022-08-10
Attending: INTERNAL MEDICINE
Payer: COMMERCIAL

## 2022-08-10 DIAGNOSIS — E78.00 ELEVATED LDL CHOLESTEROL LEVEL: ICD-10-CM

## 2022-08-10 LAB
CHOLEST SERPL-MCNC: 164 MG/DL (ref ?–200)
FASTING PATIENT LIPID ANSWER: YES
HDLC SERPL-MCNC: 48 MG/DL (ref 40–59)
LDLC SERPL CALC-MCNC: 99 MG/DL (ref ?–100)
NONHDLC SERPL-MCNC: 116 MG/DL (ref ?–130)
TRIGL SERPL-MCNC: 92 MG/DL (ref 30–149)
VLDLC SERPL CALC-MCNC: 15 MG/DL (ref 0–30)

## 2022-08-10 PROCEDURE — 80061 LIPID PANEL: CPT | Performed by: INTERNAL MEDICINE

## 2022-08-12 ENCOUNTER — TELEPHONE (OUTPATIENT)
Dept: INTERNAL MEDICINE CLINIC | Facility: CLINIC | Age: 32
End: 2022-08-12
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
## 2022-08-12 NOTE — TELEPHONE ENCOUNTER
VM replied that pt could repeat tests in 2 months to make sure he is still negative following finger stick at work. Spoke to pt for separate lab result today. Pt stated he would like to repeat the labs in 2 months. Orders placed.

## 2022-08-12 NOTE — TELEPHONE ENCOUNTER
----- Message from Aniyah Youngblood MD sent at 8/11/2022 12:12 PM CDT -----  He could repeat it in 2 montths  ----- Message -----  From: Lottie Mi RN  Sent: 8/10/2022   3:56 PM CDT  To: Aniyah Youngblood MD    [phone] spoke to pt, gave results and recommendations. Pt v/u. Pt would like to know if he needs to re-test in the future to make sure he is still negative after needle stick accident at work?

## 2022-10-04 ENCOUNTER — OFFICE VISIT (OUTPATIENT)
Dept: INTERNAL MEDICINE CLINIC | Facility: CLINIC | Age: 32
End: 2022-10-04
Payer: COMMERCIAL

## 2022-10-04 VITALS
WEIGHT: 193.19 LBS | TEMPERATURE: 98 F | HEIGHT: 72 IN | RESPIRATION RATE: 16 BRPM | BODY MASS INDEX: 26.17 KG/M2 | HEART RATE: 76 BPM | SYSTOLIC BLOOD PRESSURE: 100 MMHG | OXYGEN SATURATION: 99 % | DIASTOLIC BLOOD PRESSURE: 62 MMHG

## 2022-10-04 DIAGNOSIS — Z00.00 LABORATORY EXAMINATION ORDERED AS PART OF A ROUTINE GENERAL MEDICAL EXAMINATION: ICD-10-CM

## 2022-10-04 DIAGNOSIS — R53.83 OTHER FATIGUE: Primary | ICD-10-CM

## 2022-10-04 PROCEDURE — 3074F SYST BP LT 130 MM HG: CPT | Performed by: INTERNAL MEDICINE

## 2022-10-04 PROCEDURE — 99214 OFFICE O/P EST MOD 30 MIN: CPT | Performed by: INTERNAL MEDICINE

## 2022-10-04 PROCEDURE — 90686 IIV4 VACC NO PRSV 0.5 ML IM: CPT | Performed by: INTERNAL MEDICINE

## 2022-10-04 PROCEDURE — 90471 IMMUNIZATION ADMIN: CPT | Performed by: INTERNAL MEDICINE

## 2022-10-04 PROCEDURE — 3008F BODY MASS INDEX DOCD: CPT | Performed by: INTERNAL MEDICINE

## 2022-10-04 PROCEDURE — 3078F DIAST BP <80 MM HG: CPT | Performed by: INTERNAL MEDICINE

## 2022-10-13 ENCOUNTER — LAB ENCOUNTER (OUTPATIENT)
Dept: LAB | Age: 32
End: 2022-10-13
Attending: INTERNAL MEDICINE
Payer: COMMERCIAL
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
## 2022-10-13 DIAGNOSIS — R53.83 OTHER FATIGUE: ICD-10-CM

## 2022-10-13 DIAGNOSIS — Z00.00 LABORATORY EXAMINATION ORDERED AS PART OF A ROUTINE GENERAL MEDICAL EXAMINATION: ICD-10-CM

## 2022-10-13 LAB
ALBUMIN SERPL-MCNC: 3.9 G/DL (ref 3.4–5)
ALBUMIN/GLOB SERPL: 1.1 {RATIO} (ref 1–2)
ALP LIVER SERPL-CCNC: 57 U/L
ALT SERPL-CCNC: 28 U/L
ANION GAP SERPL CALC-SCNC: 5 MMOL/L (ref 0–18)
AST SERPL-CCNC: 20 U/L (ref 15–37)
BASOPHILS # BLD AUTO: 0.03 X10(3) UL (ref 0–0.2)
BASOPHILS NFR BLD AUTO: 0.6 %
BILIRUB SERPL-MCNC: 0.4 MG/DL (ref 0.1–2)
BILIRUB UR QL STRIP.AUTO: NEGATIVE
BUN BLD-MCNC: 18 MG/DL (ref 7–18)
CALCIUM BLD-MCNC: 9.1 MG/DL (ref 8.5–10.1)
CHLORIDE SERPL-SCNC: 108 MMOL/L (ref 98–112)
CLARITY UR REFRACT.AUTO: CLEAR
CO2 SERPL-SCNC: 27 MMOL/L (ref 21–32)
COLOR UR AUTO: YELLOW
CREAT BLD-MCNC: 1.1 MG/DL
CRP SERPL-MCNC: <0.29 MG/DL (ref ?–0.3)
EOSINOPHIL # BLD AUTO: 0.08 X10(3) UL (ref 0–0.7)
EOSINOPHIL NFR BLD AUTO: 1.7 %
ERYTHROCYTE [DISTWIDTH] IN BLOOD BY AUTOMATED COUNT: 12.3 %
ERYTHROCYTE [SEDIMENTATION RATE] IN BLOOD: 11 MM/HR
EST. AVERAGE GLUCOSE BLD GHB EST-MCNC: 120 MG/DL (ref 68–126)
FASTING STATUS PATIENT QL REPORTED: YES
GFR SERPLBLD BASED ON 1.73 SQ M-ARVRAT: 93 ML/MIN/1.73M2 (ref 60–?)
GLOBULIN PLAS-MCNC: 3.7 G/DL (ref 2.8–4.4)
GLUCOSE BLD-MCNC: 90 MG/DL (ref 70–99)
GLUCOSE UR STRIP.AUTO-MCNC: NEGATIVE MG/DL
HBA1C MFR BLD: 5.8 % (ref ?–5.7)
HBV SURFACE AB SER QL: REACTIVE
HBV SURFACE AB SERPL IA-ACNC: 23.57 MIU/ML
HBV SURFACE AG SER-ACNC: <0.1 [IU]/L
HBV SURFACE AG SERPL QL IA: NONREACTIVE
HCT VFR BLD AUTO: 47.7 %
HCV AB SERPL QL IA: NONREACTIVE
HGB BLD-MCNC: 15.7 G/DL
IMM GRANULOCYTES # BLD AUTO: 0.01 X10(3) UL (ref 0–1)
IMM GRANULOCYTES NFR BLD: 0.2 %
KETONES UR STRIP.AUTO-MCNC: NEGATIVE MG/DL
LEUKOCYTE ESTERASE UR QL STRIP.AUTO: NEGATIVE
LYMPHOCYTES # BLD AUTO: 1.48 X10(3) UL (ref 1–4)
LYMPHOCYTES NFR BLD AUTO: 32 %
MCH RBC QN AUTO: 29.6 PG (ref 26–34)
MCHC RBC AUTO-ENTMCNC: 32.9 G/DL (ref 31–37)
MCV RBC AUTO: 90 FL
MONOCYTES # BLD AUTO: 0.38 X10(3) UL (ref 0.1–1)
MONOCYTES NFR BLD AUTO: 8.2 %
NEUTROPHILS # BLD AUTO: 2.64 X10 (3) UL (ref 1.5–7.7)
NEUTROPHILS # BLD AUTO: 2.64 X10(3) UL (ref 1.5–7.7)
NEUTROPHILS NFR BLD AUTO: 57.3 %
NITRITE UR QL STRIP.AUTO: NEGATIVE
OSMOLALITY SERPL CALC.SUM OF ELEC: 291 MOSM/KG (ref 275–295)
PH UR STRIP.AUTO: 6 [PH] (ref 5–8)
PLATELET # BLD AUTO: 203 10(3)UL (ref 150–450)
POTASSIUM SERPL-SCNC: 4.4 MMOL/L (ref 3.5–5.1)
PROT SERPL-MCNC: 7.6 G/DL (ref 6.4–8.2)
PROT UR STRIP.AUTO-MCNC: NEGATIVE MG/DL
RBC # BLD AUTO: 5.3 X10(6)UL
RBC UR QL AUTO: NEGATIVE
SODIUM SERPL-SCNC: 140 MMOL/L (ref 136–145)
SP GR UR STRIP.AUTO: 1.02 (ref 1–1.03)
T4 SERPL-MCNC: 9.5 UG/DL
TSI SER-ACNC: 3.7 MIU/ML (ref 0.36–3.74)
UROBILINOGEN UR STRIP.AUTO-MCNC: <2 MG/DL
WBC # BLD AUTO: 4.6 X10(3) UL (ref 4–11)

## 2022-10-13 PROCEDURE — 87340 HEPATITIS B SURFACE AG IA: CPT

## 2022-10-13 PROCEDURE — 80053 COMPREHEN METABOLIC PANEL: CPT

## 2022-10-13 PROCEDURE — 84436 ASSAY OF TOTAL THYROXINE: CPT

## 2022-10-13 PROCEDURE — 83036 HEMOGLOBIN GLYCOSYLATED A1C: CPT

## 2022-10-13 PROCEDURE — 85652 RBC SED RATE AUTOMATED: CPT

## 2022-10-13 PROCEDURE — 84443 ASSAY THYROID STIM HORMONE: CPT

## 2022-10-13 PROCEDURE — 84402 ASSAY OF FREE TESTOSTERONE: CPT

## 2022-10-13 PROCEDURE — 87389 HIV-1 AG W/HIV-1&-2 AB AG IA: CPT

## 2022-10-13 PROCEDURE — 86140 C-REACTIVE PROTEIN: CPT

## 2022-10-13 PROCEDURE — 81003 URINALYSIS AUTO W/O SCOPE: CPT

## 2022-10-13 PROCEDURE — 84403 ASSAY OF TOTAL TESTOSTERONE: CPT

## 2022-10-13 PROCEDURE — 85025 COMPLETE CBC W/AUTO DIFF WBC: CPT

## 2022-10-13 PROCEDURE — 86803 HEPATITIS C AB TEST: CPT

## 2022-10-13 PROCEDURE — 36415 COLL VENOUS BLD VENIPUNCTURE: CPT

## 2022-10-13 PROCEDURE — 86706 HEP B SURFACE ANTIBODY: CPT

## 2022-10-19 LAB
TESTOSTERONE TOTAL: 429.2 NG/DL
TESTOSTERONE, FREE -MS/MS: 105.1 PG/ML

## 2022-11-07 ENCOUNTER — OFFICE VISIT (OUTPATIENT)
Dept: INTERNAL MEDICINE CLINIC | Facility: CLINIC | Age: 32
End: 2022-11-07
Payer: COMMERCIAL

## 2022-11-07 VITALS
WEIGHT: 198 LBS | BODY MASS INDEX: 27.72 KG/M2 | DIASTOLIC BLOOD PRESSURE: 72 MMHG | HEART RATE: 96 BPM | RESPIRATION RATE: 16 BRPM | HEIGHT: 71 IN | OXYGEN SATURATION: 97 % | TEMPERATURE: 97 F | SYSTOLIC BLOOD PRESSURE: 112 MMHG

## 2022-11-07 DIAGNOSIS — Z00.00 ROUTINE GENERAL MEDICAL EXAMINATION AT A HEALTH CARE FACILITY: Primary | ICD-10-CM

## 2022-11-07 PROCEDURE — 3074F SYST BP LT 130 MM HG: CPT | Performed by: INTERNAL MEDICINE

## 2022-11-07 PROCEDURE — 3078F DIAST BP <80 MM HG: CPT | Performed by: INTERNAL MEDICINE

## 2022-11-07 PROCEDURE — 99395 PREV VISIT EST AGE 18-39: CPT | Performed by: INTERNAL MEDICINE

## 2022-11-07 PROCEDURE — 3008F BODY MASS INDEX DOCD: CPT | Performed by: INTERNAL MEDICINE

## 2022-11-30 ENCOUNTER — TELEPHONE (OUTPATIENT)
Dept: INTERNAL MEDICINE CLINIC | Facility: CLINIC | Age: 32
End: 2022-11-30

## 2022-11-30 DIAGNOSIS — Z01.84 IMMUNITY STATUS TESTING: Primary | ICD-10-CM

## 2022-11-30 NOTE — TELEPHONE ENCOUNTER
Pt stated that he recently was accepted into grad school and needs forms completed based on last physical and immunization record. Pt stated immunization record is due on 12/1 along with paperwork. I advised pt of paperwork policy, even in situations with tight deadlines. I advised that immunization record can be printed and picked up from . Pt v/u. Pt stated he will call the [facility] school office to get an extension regarding the forms. I also advised that Dr. Izabella Muñoz is not in today but returns tomorrow. Pt v/u and stated that he is okay with any other provider to complete the form asap. I advised immunization record will be ready for  at  and pt can drop off forms at that time.      Last OV - 11/7, CPX    FYI if patient call backs & when he drops off paperwork

## 2022-11-30 NOTE — TELEPHONE ENCOUNTER
Pt came into the office and dropped off forms. Pt states he will need it completed tomorrow. Informed pt we typically allow the providers up to 2 weeks to complete forms so I cannot guarantee it will be done by tomorrow. Informed pt we will call him once forms have been completed. Ph: [phone]    Placed in [address] fax folder.

## 2022-12-02 ENCOUNTER — OFFICE VISIT (OUTPATIENT)
Dept: OCCUPATIONAL MEDICINE | Age: 32
End: 2022-12-02
Attending: PHYSICIAN ASSISTANT

## 2022-12-06 NOTE — TELEPHONE ENCOUNTER
VM - when I place orders, the MMR titer panel says this:    [facility] = Qual/[facility] = Quant    I do not see an option for [facility] to test for QUANTITATIVE    Called [id number], spoke to Sergey who confirmed that [facility] does not do Quantitative testing for MMR, only Qualitative.

## 2022-12-07 ENCOUNTER — TELEPHONE (OUTPATIENT)
Dept: INTERNAL MEDICINE CLINIC | Facility: CLINIC | Age: 32
End: 2022-12-07

## 2022-12-07 NOTE — TELEPHONE ENCOUNTER
Pt dropped off immunization records for Dr. Matt Rodriguez to review. Placed in Dr. Matt Rodriguez fax folder.

## 2022-12-08 LAB
MITOGEN-NIL: >10 IU/ML
NIL: 0.03 IU/ML
QUANTIFERON(R)-TB GOLD PLUS, 1 TUBE: NEGATIVE
TB1-NIL: 0 IU/ML
TB2-NIL: <0 IU/ML
VARICELLA ZOSTER VIRUS$AB (IGG): 177 INDEX

## 2022-12-09 NOTE — TELEPHONE ENCOUNTER
Pt called back, notified pt of outstanding labs. Faxed orders to [facility] in [place]. Pt states he will get labs done.

## 2022-12-09 NOTE — TELEPHONE ENCOUNTER
Please verify why all the blood tests were not done when ordered.   We need to have all the results for me to fill the form out

## 2022-12-11 LAB
MEASLES ANTIBODY (IGG): <13.5 AU/ML
MITOGEN-NIL: >10 IU/ML
MUMPS VIRUS$ANTIBODY (IGG): 24.9 AU/ML
NIL: 0.13 IU/ML
QUANTIFERON(R)-TB GOLD PLUS, 1 TUBE: NEGATIVE
RUBELLA ANTIBODY (IGG): 1.41 INDEX
TB1-NIL: 0.02 IU/ML
TB2-NIL: 0.02 IU/ML

## 2022-12-12 ENCOUNTER — TELEPHONE (OUTPATIENT)
Dept: INTERNAL MEDICINE CLINIC | Facility: CLINIC | Age: 32
End: 2022-12-12

## 2022-12-12 NOTE — TELEPHONE ENCOUNTER
Clarified with Dr. Dru Champion, he will see patient in the office. Called patient to inform him LMTCB if he has any questions.

## 2022-12-12 NOTE — TELEPHONE ENCOUNTER
Appointment scheduled. Future Appointments   Date Time Provider Spencer Cuetoi   12/13/2022  2:45 PM Javier Ladd MD [facility] [place]     Symptoms include cough, fever and body aches, since Friday. Patient states he took a home covid test on Saturday and it was negative. Dr. Mariaa Deal is it okay to keep the appointment in office or would you prefer that we change it to a phone visit?

## 2022-12-13 ENCOUNTER — OFFICE VISIT (OUTPATIENT)
Dept: INTERNAL MEDICINE CLINIC | Facility: CLINIC | Age: 32
End: 2022-12-13
Payer: COMMERCIAL

## 2022-12-13 VITALS
SYSTOLIC BLOOD PRESSURE: 110 MMHG | DIASTOLIC BLOOD PRESSURE: 70 MMHG | HEART RATE: 91 BPM | OXYGEN SATURATION: 99 % | WEIGHT: 200.63 LBS | BODY MASS INDEX: 28.09 KG/M2 | RESPIRATION RATE: 16 BRPM | HEIGHT: 71 IN | TEMPERATURE: 98 F

## 2022-12-13 DIAGNOSIS — Z01.84 IMMUNITY STATUS TESTING: ICD-10-CM

## 2022-12-13 DIAGNOSIS — R05.1 ACUTE COUGH: Primary | ICD-10-CM

## 2022-12-13 PROCEDURE — 3078F DIAST BP <80 MM HG: CPT | Performed by: INTERNAL MEDICINE

## 2022-12-13 PROCEDURE — 3008F BODY MASS INDEX DOCD: CPT | Performed by: INTERNAL MEDICINE

## 2022-12-13 PROCEDURE — 99213 OFFICE O/P EST LOW 20 MIN: CPT | Performed by: INTERNAL MEDICINE

## 2022-12-13 PROCEDURE — 3074F SYST BP LT 130 MM HG: CPT | Performed by: INTERNAL MEDICINE

## 2022-12-13 RX ORDER — CODEINE PHOSPHATE AND GUAIFENESIN 10; 100 MG/5ML; MG/5ML
5 SOLUTION ORAL EVERY 6 HOURS PRN
Qty: 240 ML | Refills: 0 | Status: SHIPPED | OUTPATIENT
Start: 2022-12-13 | End: 2022-12-23

## 2022-12-13 RX ORDER — AZITHROMYCIN 250 MG/1
TABLET, FILM COATED ORAL
Qty: 6 TABLET | Refills: 0 | Status: SHIPPED | OUTPATIENT
Start: 2022-12-13 | End: 2022-12-18

## 2022-12-13 NOTE — PATIENT INSTRUCTIONS
Lab work discussed with patient patient will need measles immunization after this current febrile episode.

## 2022-12-14 ENCOUNTER — TELEPHONE (OUTPATIENT)
Dept: INTERNAL MEDICINE CLINIC | Facility: CLINIC | Age: 32
End: 2022-12-14

## 2022-12-15 RX ORDER — HYDROCODONE POLISTIREX AND CHLORPHENIRAMINE POLISTIREX 10; 8 MG/5ML; MG/5ML
5 SUSPENSION, EXTENDED RELEASE ORAL 2 TIMES DAILY PRN
Qty: 115 ML | Refills: 0 | Status: SHIPPED | OUTPATIENT
Start: 2022-12-15 | End: 2022-12-25

## 2022-12-15 NOTE — TELEPHONE ENCOUNTER
Patient came in and stated that guaiFENesin-codeine (Albaro Nath) 100-10 MG/5ML  is currently out of stock at pharmacies   Is there any alternatives

## 2023-01-03 ENCOUNTER — TELEPHONE (OUTPATIENT)
Dept: INTERNAL MEDICINE CLINIC | Facility: CLINIC | Age: 33
End: 2023-01-03

## 2023-01-03 NOTE — TELEPHONE ENCOUNTER
RN was given incoming fax by MA/RAGINI from:    [facility] #[phone]    Requesting additional diagnosis for pt's lab test completed on 12/6/22   Quantiferon TB Gold Plus 1     Pt's insurance not accepting the diagnosis code Z01.84 Immunity Status Testing. Added additional diagnosis Z11.1 Screening for tuberculosis - patient needs this testing for his ongoing attendance at [employer]. Faxed paperwork back to [employer] at #[phone] and received confirmation page that the fax went through.

## 2023-01-06 ENCOUNTER — NURSE ONLY (OUTPATIENT)
Dept: INTERNAL MEDICINE CLINIC | Facility: CLINIC | Age: 33
End: 2023-01-06

## 2023-01-06 PROCEDURE — 90707 MMR VACCINE SC: CPT | Performed by: INTERNAL MEDICINE

## 2023-01-06 PROCEDURE — 90471 IMMUNIZATION ADMIN: CPT | Performed by: INTERNAL MEDICINE

## 2023-11-13 ENCOUNTER — HOSPITAL ENCOUNTER (OUTPATIENT)
Age: 33
Discharge: HOME | End: 2023-11-13
Attending: EMERGENCY MEDICINE
Payer: COMMERCIAL

## 2023-11-13 ENCOUNTER — APPOINTMENT (OUTPATIENT)
Dept: CT IMAGING | Age: 33
End: 2023-11-13
Attending: PHYSICIAN ASSISTANT
Payer: COMMERCIAL

## 2023-11-13 ENCOUNTER — APPOINTMENT (OUTPATIENT)
Dept: GENERAL RADIOLOGY | Age: 33
End: 2023-11-13
Attending: PHYSICIAN ASSISTANT
Payer: COMMERCIAL

## 2023-11-13 VITALS
TEMPERATURE: 99 F | DIASTOLIC BLOOD PRESSURE: 70 MMHG | RESPIRATION RATE: 16 BRPM | BODY MASS INDEX: 27 KG/M2 | OXYGEN SATURATION: 97 % | SYSTOLIC BLOOD PRESSURE: 127 MMHG | WEIGHT: 193 LBS | HEART RATE: 89 BPM

## 2023-11-13 DIAGNOSIS — J01.40 ACUTE NON-RECURRENT PANSINUSITIS: Primary | ICD-10-CM

## 2023-11-13 DIAGNOSIS — R91.1 LUNG NODULE, SOLITARY: ICD-10-CM

## 2023-11-13 DIAGNOSIS — R04.2 BLOOD-TINGED SPUTUM: ICD-10-CM

## 2023-11-13 LAB
S PYO AG THROAT QL IA.RAPID: NEGATIVE
SARS-COV-2 RNA RESP QL NAA+PROBE: NOT DETECTED

## 2023-11-13 PROCEDURE — 71250 CT THORAX DX C-: CPT | Performed by: PHYSICIAN ASSISTANT

## 2023-11-13 PROCEDURE — 87651 STREP A DNA AMP PROBE: CPT | Performed by: EMERGENCY MEDICINE

## 2023-11-13 PROCEDURE — 99214 OFFICE O/P EST MOD 30 MIN: CPT

## 2023-11-13 PROCEDURE — 99215 OFFICE O/P EST HI 40 MIN: CPT

## 2023-11-13 PROCEDURE — 71046 X-RAY EXAM CHEST 2 VIEWS: CPT | Performed by: PHYSICIAN ASSISTANT

## 2023-11-13 RX ORDER — BENZONATATE 100 MG/1
100 CAPSULE ORAL 3 TIMES DAILY PRN
Qty: 30 CAPSULE | Refills: 0 | Status: SHIPPED | OUTPATIENT
Start: 2023-11-13 | End: 2023-12-01 | Stop reason: ALTCHOICE

## 2023-11-13 RX ORDER — AMOXICILLIN AND CLAVULANATE POTASSIUM 875; 125 MG/1; MG/1
1 TABLET, FILM COATED ORAL 2 TIMES DAILY
Qty: 14 TABLET | Refills: 0 | Status: SHIPPED | OUTPATIENT
Start: 2023-11-13 | End: 2023-11-20

## 2023-11-13 NOTE — ED INITIAL ASSESSMENT (HPI)
C/o productive cough with yellow sputum for 4 days. Pt reports sore throat, post nasal drip, sinus pressure. Pt denies anyone else sick or recent travel. Pt reports otc meds used.

## 2023-12-01 ENCOUNTER — LAB ENCOUNTER (OUTPATIENT)
Dept: LAB | Age: 33
End: 2023-12-01
Attending: INTERNAL MEDICINE
Payer: COMMERCIAL

## 2023-12-01 ENCOUNTER — OFFICE VISIT (OUTPATIENT)
Dept: INTERNAL MEDICINE CLINIC | Facility: CLINIC | Age: 33
End: 2023-12-01
Payer: COMMERCIAL

## 2023-12-01 VITALS
HEIGHT: 70.47 IN | TEMPERATURE: 99 F | WEIGHT: 200.63 LBS | BODY MASS INDEX: 28.4 KG/M2 | SYSTOLIC BLOOD PRESSURE: 104 MMHG | HEART RATE: 75 BPM | OXYGEN SATURATION: 98 % | DIASTOLIC BLOOD PRESSURE: 80 MMHG

## 2023-12-01 DIAGNOSIS — Z23 NEED FOR IMMUNIZATION AGAINST INFLUENZA: ICD-10-CM

## 2023-12-01 DIAGNOSIS — Z00.00 ENCOUNTER FOR PREVENTATIVE ADULT HEALTH CARE EXAMINATION: Primary | ICD-10-CM

## 2023-12-01 DIAGNOSIS — R53.83 FATIGUE, UNSPECIFIED TYPE: ICD-10-CM

## 2023-12-01 DIAGNOSIS — R41.840 ATTENTION AND CONCENTRATION DEFICIT: ICD-10-CM

## 2023-12-01 DIAGNOSIS — Z00.00 ENCOUNTER FOR PREVENTATIVE ADULT HEALTH CARE EXAMINATION: ICD-10-CM

## 2023-12-01 LAB
ALBUMIN SERPL-MCNC: 4.2 G/DL (ref 3.4–5)
ALBUMIN/GLOB SERPL: 1.1 {RATIO} (ref 1–2)
ALP LIVER SERPL-CCNC: 64 U/L
ALT SERPL-CCNC: 29 U/L
ANION GAP SERPL CALC-SCNC: 6 MMOL/L (ref 0–18)
AST SERPL-CCNC: 25 U/L (ref 15–37)
BASOPHILS # BLD AUTO: 0.04 X10(3) UL (ref 0–0.2)
BASOPHILS NFR BLD AUTO: 0.6 %
BILIRUB SERPL-MCNC: 0.4 MG/DL (ref 0.1–2)
BUN BLD-MCNC: 18 MG/DL (ref 9–23)
CALCIUM BLD-MCNC: 9.1 MG/DL (ref 8.5–10.1)
CHLORIDE SERPL-SCNC: 107 MMOL/L (ref 98–112)
CHOLEST SERPL-MCNC: 206 MG/DL (ref ?–200)
CO2 SERPL-SCNC: 26 MMOL/L (ref 21–32)
CREAT BLD-MCNC: 1.07 MG/DL
CRP SERPL-MCNC: 0.32 MG/DL (ref ?–0.3)
EGFRCR SERPLBLD CKD-EPI 2021: 95 ML/MIN/1.73M2 (ref 60–?)
EOSINOPHIL # BLD AUTO: 0.29 X10(3) UL (ref 0–0.7)
EOSINOPHIL NFR BLD AUTO: 4.1 %
ERYTHROCYTE [DISTWIDTH] IN BLOOD BY AUTOMATED COUNT: 12.3 %
ERYTHROCYTE [SEDIMENTATION RATE] IN BLOOD: 16 MM/HR
EST. AVERAGE GLUCOSE BLD GHB EST-MCNC: 120 MG/DL (ref 68–126)
FASTING PATIENT LIPID ANSWER: YES
FASTING STATUS PATIENT QL REPORTED: YES
GLOBULIN PLAS-MCNC: 3.9 G/DL (ref 2.8–4.4)
GLUCOSE BLD-MCNC: 85 MG/DL (ref 70–99)
HBA1C MFR BLD: 5.8 % (ref ?–5.7)
HCT VFR BLD AUTO: 46.1 %
HDLC SERPL-MCNC: 42 MG/DL (ref 40–59)
HGB BLD-MCNC: 15.5 G/DL
IMM GRANULOCYTES # BLD AUTO: 0.02 X10(3) UL (ref 0–1)
IMM GRANULOCYTES NFR BLD: 0.3 %
IRON SATN MFR SERPL: 20 %
IRON SERPL-MCNC: 94 UG/DL
LDLC SERPL CALC-MCNC: 148 MG/DL (ref ?–100)
LYMPHOCYTES # BLD AUTO: 2 X10(3) UL (ref 1–4)
LYMPHOCYTES NFR BLD AUTO: 28.6 %
MCH RBC QN AUTO: 29 PG (ref 26–34)
MCHC RBC AUTO-ENTMCNC: 33.6 G/DL (ref 31–37)
MCV RBC AUTO: 86.3 FL
MONOCYTES # BLD AUTO: 0.48 X10(3) UL (ref 0.1–1)
MONOCYTES NFR BLD AUTO: 6.9 %
NEUTROPHILS # BLD AUTO: 4.16 X10 (3) UL (ref 1.5–7.7)
NEUTROPHILS # BLD AUTO: 4.16 X10(3) UL (ref 1.5–7.7)
NEUTROPHILS NFR BLD AUTO: 59.5 %
NONHDLC SERPL-MCNC: 164 MG/DL (ref ?–130)
OSMOLALITY SERPL CALC.SUM OF ELEC: 289 MOSM/KG (ref 275–295)
PLATELET # BLD AUTO: 206 10(3)UL (ref 150–450)
POTASSIUM SERPL-SCNC: 3.9 MMOL/L (ref 3.5–5.1)
PROT SERPL-MCNC: 8.1 G/DL (ref 6.4–8.2)
RBC # BLD AUTO: 5.34 X10(6)UL
SODIUM SERPL-SCNC: 139 MMOL/L (ref 136–145)
T4 FREE SERPL-MCNC: 1.2 NG/DL (ref 0.8–1.7)
TIBC SERPL-MCNC: 459 UG/DL (ref 240–450)
TRANSFERRIN SERPL-MCNC: 308 MG/DL (ref 200–360)
TRIGL SERPL-MCNC: 87 MG/DL (ref 30–149)
TSI SER-ACNC: 4.38 MIU/ML (ref 0.36–3.74)
VIT B12 SERPL-MCNC: 892 PG/ML (ref 193–986)
VIT D+METAB SERPL-MCNC: 24.4 NG/ML (ref 30–100)
VLDLC SERPL CALC-MCNC: 16 MG/DL (ref 0–30)
WBC # BLD AUTO: 7 X10(3) UL (ref 4–11)

## 2023-12-01 PROCEDURE — 82607 VITAMIN B-12: CPT | Performed by: INTERNAL MEDICINE

## 2023-12-01 PROCEDURE — 99395 PREV VISIT EST AGE 18-39: CPT | Performed by: INTERNAL MEDICINE

## 2023-12-01 PROCEDURE — 84439 ASSAY OF FREE THYROXINE: CPT | Performed by: INTERNAL MEDICINE

## 2023-12-01 PROCEDURE — 83550 IRON BINDING TEST: CPT | Performed by: INTERNAL MEDICINE

## 2023-12-01 PROCEDURE — 3079F DIAST BP 80-89 MM HG: CPT | Performed by: INTERNAL MEDICINE

## 2023-12-01 PROCEDURE — 80061 LIPID PANEL: CPT | Performed by: INTERNAL MEDICINE

## 2023-12-01 PROCEDURE — 83036 HEMOGLOBIN GLYCOSYLATED A1C: CPT | Performed by: INTERNAL MEDICINE

## 2023-12-01 PROCEDURE — 83540 ASSAY OF IRON: CPT | Performed by: INTERNAL MEDICINE

## 2023-12-01 PROCEDURE — 82306 VITAMIN D 25 HYDROXY: CPT | Performed by: INTERNAL MEDICINE

## 2023-12-01 PROCEDURE — 3074F SYST BP LT 130 MM HG: CPT | Performed by: INTERNAL MEDICINE

## 2023-12-01 PROCEDURE — 3008F BODY MASS INDEX DOCD: CPT | Performed by: INTERNAL MEDICINE

## 2023-12-01 PROCEDURE — 85652 RBC SED RATE AUTOMATED: CPT | Performed by: INTERNAL MEDICINE

## 2023-12-01 PROCEDURE — 80050 GENERAL HEALTH PANEL: CPT | Performed by: INTERNAL MEDICINE

## 2023-12-01 PROCEDURE — 86140 C-REACTIVE PROTEIN: CPT | Performed by: INTERNAL MEDICINE

## 2023-12-01 NOTE — PATIENT INSTRUCTIONS
- Get blood tests done today  - Come back another day for testosterone blood test - this test should be done between 7-9 AM, fasting (for at least 8 hours). - Follow up with Psychiatry for ADD/ADHD evaluation and to see if medication would be recommended:  [phone]  [place], [address]  [phone]  [email or web address]    [address], [place] MENDOZA CAMPBELL, [address]  [phone]    Evar[address] Row[address]  [address]  [place], [address]  [phone]    [facility]:  [facility]  [address] ,   Anand Cartagena 1  Phone: [phone]  Fax:  [phone]  [email or web address]  Phone: [phone]  Fax:  [phone]  [email or web address]    [email or web address] (List of providers for both therapy and medication management, which is continuously updated with who is open to new patients)    It was a pleasure seeing you in the clinic today. Thank you for choosing the [facility] office for your healthcare needs. Please call at [phone] with any questions or concerns.     Carrie Raymundo MD
No

## 2023-12-02 DIAGNOSIS — R53.83 FATIGUE, UNSPECIFIED TYPE: Primary | ICD-10-CM

## 2023-12-02 PROBLEM — E55.9 VITAMIN D INSUFFICIENCY: Status: ACTIVE | Noted: 2023-12-02

## 2023-12-02 PROBLEM — R79.89 ELEVATED TSH: Status: ACTIVE | Noted: 2023-12-02

## 2023-12-02 PROBLEM — E78.00 PURE HYPERCHOLESTEROLEMIA: Status: ACTIVE | Noted: 2023-12-02

## 2023-12-05 ENCOUNTER — TELEPHONE (OUTPATIENT)
Dept: INTERNAL MEDICINE CLINIC | Facility: CLINIC | Age: 33
End: 2023-12-05

## 2023-12-05 DIAGNOSIS — E03.8 SUBCLINICAL HYPOTHYROIDISM: Primary | ICD-10-CM

## 2023-12-05 RX ORDER — LEVOTHYROXINE SODIUM 0.03 MG/1
25 TABLET ORAL DAILY
Qty: 90 TABLET | Refills: 1 | Status: SHIPPED | OUTPATIENT
Start: 2023-12-05

## 2023-12-05 NOTE — TELEPHONE ENCOUNTER
Patient calling because he was unable to respond via [employer]. Patient states that he is agreeable to resuming low dose of levothyroxine.      [address] [address] [place] [address] Marco Gallegos [address] [place] [address], [phone], [phone]

## 2023-12-07 ENCOUNTER — LAB ENCOUNTER (OUTPATIENT)
Dept: LAB | Age: 33
End: 2023-12-07
Attending: INTERNAL MEDICINE
Payer: COMMERCIAL

## 2023-12-07 DIAGNOSIS — R53.83 FATIGUE, UNSPECIFIED TYPE: ICD-10-CM

## 2023-12-07 PROCEDURE — 84402 ASSAY OF FREE TESTOSTERONE: CPT | Performed by: INTERNAL MEDICINE

## 2023-12-07 PROCEDURE — 84403 ASSAY OF TOTAL TESTOSTERONE: CPT | Performed by: INTERNAL MEDICINE

## 2023-12-11 LAB
FREE TESTOST DIRECT: 15.2 PG/ML
TESTOSTERONE: 240 NG/DL

## 2024-09-11 ENCOUNTER — HOSPITAL ENCOUNTER (OUTPATIENT)
Age: 34
Discharge: HOME | End: 2024-09-11
Payer: COMMERCIAL

## 2024-09-11 VITALS
DIASTOLIC BLOOD PRESSURE: 76 MMHG | TEMPERATURE: 98 F | OXYGEN SATURATION: 96 % | WEIGHT: 197 LBS | HEIGHT: 72 IN | RESPIRATION RATE: 16 BRPM | HEART RATE: 87 BPM | BODY MASS INDEX: 26.68 KG/M2 | SYSTOLIC BLOOD PRESSURE: 136 MMHG

## 2024-09-11 DIAGNOSIS — B34.9 VIRAL SYNDROME: ICD-10-CM

## 2024-09-11 DIAGNOSIS — Z76.0 ENCOUNTER FOR MEDICATION REFILL: Primary | ICD-10-CM

## 2024-09-11 DIAGNOSIS — E03.8 SUBCLINICAL HYPOTHYROIDISM: ICD-10-CM

## 2024-09-11 LAB — SARS-COV-2 RNA RESP QL NAA+PROBE: NOT DETECTED

## 2024-09-11 PROCEDURE — 99213 OFFICE O/P EST LOW 20 MIN: CPT

## 2024-09-11 RX ORDER — LEVOTHYROXINE SODIUM 25 UG/1
25 TABLET ORAL DAILY
Qty: 90 TABLET | Refills: 1 | Status: SHIPPED | OUTPATIENT
Start: 2024-09-11

## 2024-09-11 RX ORDER — FINASTERIDE 1 MG/1
1 TABLET, FILM COATED ORAL DAILY
Qty: 90 TABLET | Refills: 3 | Status: SHIPPED | OUTPATIENT
Start: 2024-09-11

## 2024-09-11 NOTE — ED PROVIDER NOTES
Patient Seen in: [facility]      History     Chief Complaint   Patient presents with    Fever     Stated Complaint: Flu-like symptoms    Subjective:   HPI    34-year-old male who comes in today complaining of chills sweats body aches congestion nasal discharge he just returned from a cruise symptoms started Monday.  Patient denies any shortness of breath or chest pain.  Denies nausea vomiting diarrhea.    Objective:   Past Medical History:    TMJ tenderness, left              History reviewed. No pertinent surgical history.             Social History     Socioeconomic History    Marital status: Single   Tobacco Use    Smoking status: Never    Smokeless tobacco: Never   Vaping Use    Vaping status: Never Used   Substance and Sexual Activity    Alcohol use: Not Currently     Comment: occ    Drug use: No   Other Topics Concern    Caffeine Concern No    Exercise Yes    Seat Belt Yes    Special Diet No    Stress Concern No    Weight Concern No              Review of Systems    Positive for stated Chief Complaint: Fever    Other systems are as noted in HPI.  Constitutional and vital signs reviewed.      All other systems reviewed and negative except as noted above.    Physical Exam     ED Triage Vitals [09/11/24 1353]   /76   Pulse 87   Resp 16   Temp 98.4 °F (36.9 °C)   Temp src    SpO2 96 %   O2 Device None (Room air)       Current Vitals:   Vital Signs  BP: 136/76  Pulse: 87  Resp: 16  Temp: 98.4 °F (36.9 °C)    Oxygen Therapy  SpO2: 96 %  O2 Device: None (Room air)            Physical Exam    General Appearance: Alert, cooperative, no distress, appropriate for age   Head: Normocephalic, without obvious abnormality   Eyes: PERRL,  conjunctiva and cornea clear, both eyes   Ears: TM pearly gray color and semitransparent, external ear canals normal, both ears   Nose: Nares symmetrical, septum midline, mucosa normal, clear watery discharge; no sinus tenderness   Throat: Lips, tongue, and mucosa are  moist, pink, and intact; teeth intact. No erythema, no exudates or tonsillar hypertrophy, uvula midline, no trismus or drooling no phonation changes, patient handling secretions well   Neck: Supple; no anterior or posterior cervical adenopathy, no neck rigidity or meningeal signs  Lungs: Clear to auscultation bilaterally, respirations unlabored. No wheezing, rales or rhonchi.   Heart: NSR, S1, S2 present. No murmurs, rubs or gallops.  Skin: no rash       ED Course     Labs Reviewed   RAPID SARS-COV-2 BY PCR - Normal             MDM             Medical Decision Making  34-year-old male who comes in today complaining of respiratory symptoms that started 2 days ago patient just came back from a cruise.  He also has not seen PCP and needs a refill on his medications.  Patient denies any new symptoms.    Problems Addressed:  Viral syndrome: acute illness or injury    Amount and/or Complexity of Data Reviewed  Labs: ordered. Decision-making details documented in ED Course.     Details: COVID-negative    Risk  OTC drugs.  Prescription drug management.  Risk Details: Clinical Impression: Viral upper respiratory infection, hypothyroidism medication refilled      The differential diagnosis before testing included viral syndrome, Acute Sinusitis, pneumonia, which is a medical condition that poses a threat to life/function.             Disposition and Plan     Clinical Impression:  1. Encounter for medication refill    2. Viral syndrome    3. Subclinical hypothyroidism         Disposition:  Discharge  9/11/2024  2:35 pm    Follow-up:  Alexis Miguel MD  [address] [place] [id number] [phone]    Schedule an appointment as soon as possible for a visit             Medications Prescribed:  Discharge Medication List as of 9/11/2024  2:48 PM

## 2024-09-11 NOTE — ED INITIAL ASSESSMENT (HPI)
Pt here w/ c/o chills, sweats, body aches, congestion, Yellow nasal discharge. Pt just returned from cruise.

## 2025-02-16 ENCOUNTER — HOSPITAL ENCOUNTER (OUTPATIENT)
Age: 35
Discharge: ANOTHER HOSPITAL | End: 2025-02-16
Attending: EMERGENCY MEDICINE
Payer: COMMERCIAL

## 2025-02-16 VITALS
SYSTOLIC BLOOD PRESSURE: 125 MMHG | DIASTOLIC BLOOD PRESSURE: 53 MMHG | HEART RATE: 140 BPM | TEMPERATURE: 103 F | OXYGEN SATURATION: 96 % | WEIGHT: 200 LBS | HEIGHT: 72 IN | RESPIRATION RATE: 20 BRPM | BODY MASS INDEX: 27.09 KG/M2

## 2025-02-16 DIAGNOSIS — R50.9 ACUTE FEBRILE ILLNESS: Primary | ICD-10-CM

## 2025-02-16 LAB
POCT INFLUENZA A: NEGATIVE
POCT INFLUENZA B: NEGATIVE
SARS-COV-2 RNA RESP QL NAA+PROBE: NOT DETECTED

## 2025-02-16 PROCEDURE — 99214 OFFICE O/P EST MOD 30 MIN: CPT

## 2025-02-16 PROCEDURE — 99213 OFFICE O/P EST LOW 20 MIN: CPT

## 2025-02-16 PROCEDURE — 87502 INFLUENZA DNA AMP PROBE: CPT | Performed by: EMERGENCY MEDICINE

## 2025-02-16 RX ORDER — IBUPROFEN 600 MG/1
600 TABLET, FILM COATED ORAL ONCE
Status: COMPLETED | OUTPATIENT
Start: 2025-02-16 | End: 2025-02-16

## 2025-02-16 NOTE — ED PROVIDER NOTES
Patient Seen in: [facility]      History     Chief Complaint   Patient presents with    Fever     Stated Complaint: flu like symptoms    Subjective:   HPI      Returned from [facility] yesterday. Temp of 103. Minimal cough in the morning. No significant URI symptoms. No vomiting or diarrhea. No difficulty breathing.     Objective:     Past Medical History:    TMJ tenderness, left              History reviewed. No pertinent surgical history.             Social History     Socioeconomic History    Marital status: Single   Tobacco Use    Smoking status: Never    Smokeless tobacco: Never   Vaping Use    Vaping status: Never Used   Substance and Sexual Activity    Alcohol use: Not Currently     Comment: occ    Drug use: No   Other Topics Concern    Caffeine Concern No    Exercise Yes    Seat Belt Yes    Special Diet No    Stress Concern No    Weight Concern No              Review of Systems    Positive for stated complaint: flu like symptoms  Other systems are as noted in HPI.  Constitutional and vital signs reviewed.      All other systems reviewed and negative except as noted above.    Physical Exam     ED Triage Vitals [02/16/25 1518]   /53   Pulse (!) 140   Resp 20   Temp (!) 103.3 °F (39.6 °C)   Temp src Oral   SpO2 96 %   O2 Device None (Room air)       Current Vitals:   Vital Signs  BP: 125/53  Pulse: (!) 140  Resp: 20  Temp: (!) 103.3 °F (39.6 °C)  Temp src: Oral    Oxygen Therapy  SpO2: 96 %  O2 Device: None (Room air)        Physical Exam  Vitals and nursing note reviewed.   Constitutional:       Appearance: Normal appearance. He is well-developed.   HENT:      Head: Normocephalic and atraumatic.      Nose: Nose normal.      Mouth/Throat:      Mouth: Mucous membranes are moist.      Pharynx: Oropharynx is clear.   Cardiovascular:      Rate and Rhythm: Normal rate and regular rhythm.      Heart sounds: Normal heart sounds.   Pulmonary:      Effort: Pulmonary effort is normal. No respiratory  distress.      Breath sounds: Rhonchi (few scattered rhonchi) present.   Skin:     General: Skin is warm and dry.      Capillary Refill: Capillary refill takes less than 2 seconds.   Neurological:      General: No focal deficit present.      Mental Status: He is alert.   Psychiatric:         Mood and Affect: Mood normal.         Behavior: Behavior normal.            ED Course     Labs Reviewed   POCT FLU TEST - Normal    Narrative:     This assay is a rapid molecular in vitro test utilizing nucleic acid amplification of influenza A and B viral RNA.   RAPID SARS-COV-2 BY PCR - Normal                   MDM           Medical Decision Making    Covid and flu both negative. To ER for further eval of fever after travel to [place].   Disposition and Plan     Clinical Impression:  1. Acute febrile illness         Disposition:  Ic to ed  2/16/2025  3:43 pm    Follow-up:  No follow-up provider specified.        Medications Prescribed:  Current Discharge Medication List              Supplementary Documentation:

## 2025-02-16 NOTE — ED INITIAL ASSESSMENT (HPI)
Patient reports a fever since Thursday or Friday.  Patient also reports fatigue, headache, and cough.

## 2025-03-31 ENCOUNTER — HOSPITAL ENCOUNTER (OUTPATIENT)
Age: 35
Discharge: HOME | End: 2025-03-31
Payer: COMMERCIAL

## 2025-03-31 VITALS
BODY MASS INDEX: 27.22 KG/M2 | DIASTOLIC BLOOD PRESSURE: 67 MMHG | OXYGEN SATURATION: 98 % | WEIGHT: 201 LBS | TEMPERATURE: 99 F | RESPIRATION RATE: 16 BRPM | HEART RATE: 78 BPM | HEIGHT: 72 IN | SYSTOLIC BLOOD PRESSURE: 122 MMHG

## 2025-03-31 DIAGNOSIS — H10.33 ACUTE CONJUNCTIVITIS OF BOTH EYES, UNSPECIFIED ACUTE CONJUNCTIVITIS TYPE: Primary | ICD-10-CM

## 2025-03-31 PROCEDURE — 99213 OFFICE O/P EST LOW 20 MIN: CPT

## 2025-03-31 RX ORDER — POLYMYXIN B SULFATE AND TRIMETHOPRIM 1; 10000 MG/ML; [USP'U]/ML
1 SOLUTION OPHTHALMIC
Qty: 10 ML | Refills: 0 | Status: SHIPPED | OUTPATIENT
Start: 2025-03-31 | End: 2025-04-10

## 2025-03-31 NOTE — DISCHARGE INSTRUCTIONS
Conjunctivitis supportive care and infection control measures:   - Use eye medication as directed and for a full 7-10 days (do not let bottle/tube touch the eye, as this can contaminate the container)   - Wash hands often   - Do not rub / touch eyes. If you do, immediate wash your hands.   - Do not use face towel more than once before washing   - Do not use contacts (if applicable)   - Changes pillow case daily for 2-3 days   - Disinfect environment as appropriate (e.g. toys, handles, \"high touch items / surfaces\")   - You are considered contagious for 24hrs from starting antibiotics   - Follow up with primary care provider as needed    General Health Maintenance   - Overall goal: Improve body functioning through improved cellular health by decrease inflammation   - Move more (7k steps a day improves cardiovascular health - approx 1.5hrs of cumulative walking per day)   - Sleep better (consistent bedtime, sleep mask, magnesium glycinate)   - Eat \"real\" food (avoid high processed foods such as: snack foods, margarine, frozen foods, most breads)   - Avoid sugar which is very inflammatory and interferes with cellular function   - Avoid excessive dairy (milk, cheese, etc. [Yogurt is ok])   - Early exposure to daylight   - Drink more water (preferably filtered)   - Prioritize protein with every meal   - Supplement nutrient dietary deficiencies with multivitamins, extra vitamin D (2000IU), Magnesium glycinate, and probiotics daily, year round   - Yearly health monitoring by your primary care provider     *individual modifications may be required

## 2025-03-31 NOTE — ED PROVIDER NOTES
History   No chief complaint on file.      Subjective:   PASQUALE Penn A Alfred, 34 year old male with notable medical history of low vitamin D who presents with eye concern.  Patient repots initially having Right eye injection which now includes his Left eye as well. He reports morning discharge bilaterally. Patient was seen by a co-worker (works in a hospital) and was advised on likely viral conjunctivitis, but given additional 4 days of symptoms, patient was advised to seek formal evaluation. Denies fever, chills, n/v/d, cough, known contacts with conjunctivitis.      Patient Active Problem List   Diagnosis    Dermatitis    Hair loss    Vitamin D insufficiency    Elevated TSH    Pure hypercholesterolemia      Objective:   No pertinent past medical history.            No pertinent past surgical history.              No pertinent social history.            Medications Ordered Prior to Encounter[1]      Constitutional and vital signs reviewed.      All other systems reviewed and negative except as noted above.    I have reviewed the family history, social history, allergies, and outpatient medications.     History reviewed from EMR: Encounters, problem list, allergies, medications      Physical Exam     ED Triage Vitals [03/31/25 1120]   /67   Pulse 78   Resp 16   Temp 98.5 °F (36.9 °C)   Temp src Oral   SpO2 98 %   O2 Device None (Room air)       Current:/67   Pulse 78   Temp 98.5 °F (36.9 °C) (Oral)   Resp 16   Ht 182.9 cm (6')   Wt 91.2 kg   SpO2 98%   BMI 27.26 kg/m²       Physical Exam  Vitals and nursing note reviewed.   Constitutional:       General: He is not in acute distress.     Appearance: Normal appearance. He is normal weight. He is not ill-appearing or toxic-appearing.   HENT:      Head: Normocephalic and atraumatic.      Right Ear: External ear normal.      Left Ear: External ear normal.      Nose: Nose normal. No congestion or rhinorrhea.      Mouth/Throat:      Mouth: Mucous  membranes are moist.   Eyes:      Extraocular Movements: Extraocular movements intact.      Conjunctiva/sclera:      Right eye: Right conjunctiva is injected.      Left eye: Left conjunctiva is injected.      Pupils: Pupils are equal, round, and reactive to light.      Comments: Bilateral injected conjunctiva   Cardiovascular:      Rate and Rhythm: Normal rate.      Pulses: Normal pulses.   Pulmonary:      Effort: Pulmonary effort is normal. No respiratory distress.   Musculoskeletal:         General: No swelling, tenderness or signs of injury. Normal range of motion.      Cervical back: Normal range of motion.   Skin:     General: Skin is warm and dry.      Capillary Refill: Capillary refill takes less than 2 seconds.   Neurological:      General: No focal deficit present.      Mental Status: He is alert and oriented to person, place, and time. Mental status is at baseline.   Psychiatric:         Mood and Affect: Mood normal.         Behavior: Behavior normal.         Thought Content: Thought content normal.         Judgment: Judgment normal.            ED Course     Labs Reviewed - No data to display  No orders to display       Vitals:    03/31/25 1120   BP: 122/67   Pulse: 78   Resp: 16   Temp: 98.5 °F (36.9 °C)   TempSrc: Oral   SpO2: 98%   Weight: 91.2 kg   Height: 182.9 cm (6')            LUPE        Fili A Alfred, 34 year old male with medical history as noted above who presents with eye concern.   - PATIENT in NAD, VSS   - Conjunctivitis vs allergies vs other   - Exam c/w bilateral conjunctivitis   - Given duration of symptoms, will treat as bacterial   - Supportive care and infection control measures discussed   - Follow up with primary care provider as needed   - Work note provided       ** See ED course below for additional information on care provided / interventions / notable events throughout patient's encounter.    ** See Home Care Instructions below for care measures to trial as applicable.          ** I have independently reviewed the radiology images, clinical lab results, and ECG tracings as described above (if applicable)    ** Concerning co-morbidities possibly affecting complaint / care: n/a    ** See disposition & plan section below for home care instructions - if applicable        Medical Decision Making  Risk  OTC drugs.  Prescription drug management.        Disposition and Plan     Disposition:  Discharge  3/31/2025 11:30 am    Clinical Impression:  1. Acute conjunctivitis of both eyes, unspecified acute conjunctivitis type            Home care instructions:      Conjunctivitis supportive care and infection control measures:   - Use eye medication as directed and for a full 7-10 days (do not let bottle/tube touch the eye, as this can contaminate the container)   - Wash hands often   - Do not rub / touch eyes. If you do, immediate wash your hands.   - Do not use face towel more than once before washing   - Do not use contacts (if applicable)   - Changes pillow case daily for 2-3 days   - Disinfect environment as appropriate (e.g. toys, handles, \"high touch items / surfaces\")   - You are considered contagious for 24hrs from starting antibiotics   - Follow up with primary care provider as needed    General Health Maintenance   - Overall goal: Improve body functioning through improved cellular health by decrease inflammation   - Move more (7k steps a day improves cardiovascular health - approx 1.5hrs of cumulative walking per day)   - Sleep better (consistent bedtime, sleep mask, magnesium glycinate)   - Eat \"real\" food (avoid high processed foods such as: snack foods, margarine, frozen foods, most breads)   - Avoid sugar which is very inflammatory and interferes with cellular function   - Avoid excessive dairy (milk, cheese, etc. [Yogurt is ok])   - Early exposure to daylight   - Drink more water (preferably filtered)   - Prioritize protein with every meal   - Supplement nutrient dietary deficiencies  with multivitamins, extra vitamin D (2000IU), Magnesium glycinate, and probiotics daily, year round   - Yearly health monitoring by your primary care provider     *individual modifications may be required        Follow-up:  Alexis Miguel MD  [address] [place] [id number] [phone]      As needed          Medications Prescribed:  Discharge Medication List as of 3/31/2025 11:32 AM        START taking these medications    Details   polymyxin B-trimethoprim 82201-1.1 UNIT/ML-% Ophthalmic Solution Apply 1 drop to eye Q3H While Awake for 10 days., Normal, Disp-10 mL, R-0               Kemar Meade, DNP, APRN, AGACNP-BC, FNP-C, CNL  Adult-Gerontology Acute Care & Family Nurse Practitioner  [facility]      The above patient (and/or guardian) was made aware that an appropriate evaluation has been performed, and that no additional testing is required at this time. In my medical judgment, there is currently no evidence of an immediate life-threatening or surgical condition, therefore discharge is indicated at this time. The patient (and/or guardian) was advised that a small risk still exists that a serious condition could develop. The patient was instructed to arrange close follow-up with their primary care provider (or the referral provider given today). The patient received written and verbal instructions regarding their condition / concerns, demonstrated understanding, and is agreement with the outpatient treatment plan.              [1]   Current Facility-Administered Medications on File Prior to Encounter   Medication Dose Route Frequency Provider Last Rate Last Admin    [COMPLETED] ibuprofen (Motrin) tab 600 mg  600 mg Oral Once Keesha Damian MD   600 mg at 02/16/25 1520     Current Outpatient Medications on File Prior to Encounter   Medication Sig Dispense Refill    finasteride (PROPECIA) 1 MG Oral Tab Take 1 tablet (1 mg total) by mouth daily. 90 tablet 3    levothyroxine  25 MCG Oral Tab Take 1 tablet (25 mcg total) by mouth daily. 90 tablet 1

## 2025-06-26 ENCOUNTER — HOSPITAL ENCOUNTER (OUTPATIENT)
Age: 35
Discharge: HOME | End: 2025-06-26
Payer: COMMERCIAL

## 2025-06-26 VITALS
BODY MASS INDEX: 27.77 KG/M2 | HEIGHT: 72 IN | TEMPERATURE: 100 F | WEIGHT: 205 LBS | DIASTOLIC BLOOD PRESSURE: 59 MMHG | OXYGEN SATURATION: 97 % | SYSTOLIC BLOOD PRESSURE: 146 MMHG | RESPIRATION RATE: 22 BRPM | HEART RATE: 95 BPM

## 2025-06-26 DIAGNOSIS — J02.9 VIRAL PHARYNGITIS: Primary | ICD-10-CM

## 2025-06-26 LAB
S PYO AG THROAT QL IA.RAPID: NEGATIVE
SARS-COV-2 RNA RESP QL NAA+PROBE: NOT DETECTED

## 2025-06-26 PROCEDURE — 99213 OFFICE O/P EST LOW 20 MIN: CPT

## 2025-06-26 PROCEDURE — 99212 OFFICE O/P EST SF 10 MIN: CPT

## 2025-06-26 PROCEDURE — 87651 STREP A DNA AMP PROBE: CPT | Performed by: PHYSICIAN ASSISTANT

## 2025-06-26 NOTE — ED PROVIDER NOTES
Patient Seen in: [facility]        History  Chief Complaint   Patient presents with    Sore Throat     Stated Complaint: Cough    Subjective:   HPI          Fili Simon is a 34 year old male  presents with throat pain x 4 days. Patient reports dysphagia to both solids and liquids, ear pain, rhinorrhea, fevers, chills. Patient denies shortness of breath, respiratory distress, stridor, neck pain/ stiffness, headache, eye pain/ redness, facial/ lip/ eyelid swelling. No medications taken prior to arrival. No alleviating/ aggravating factors. Pain 2/10          Objective:     Past Medical History:    TMJ tenderness, left              History reviewed. No pertinent surgical history.             Social History     Socioeconomic History    Marital status: Single   Tobacco Use    Smoking status: Never    Smokeless tobacco: Never   Vaping Use    Vaping status: Never Used   Substance and Sexual Activity    Alcohol use: Not Currently     Comment: occ    Drug use: No   Other Topics Concern    Caffeine Concern No    Exercise Yes    Seat Belt Yes    Special Diet No    Stress Concern No    Weight Concern No     Social Drivers of Health     Food Insecurity: Low Risk  (3/24/2025)    Received from [facility]    Food Insecurity     Have there been times that your food ran out, and you didn't have money to get more?: No     Are there times that you worry that this might happen?: No   Transportation Needs: Low Risk  (3/24/2025)    Received from [facility]    Transportation Needs     Do you have trouble getting transportation to medical appointments?: No   Housing Stability: Low Risk  (3/24/2025)    Received from [facility]    Housing Stability     Are you worried that your electric, gas, oil, or water might be shut off?: No     Are you concerned about having a safe and reliable place to live?: No              Review of Systems   All other systems  reviewed and are negative.      Positive for stated complaint: Cough  Other systems are as noted in HPI.  Constitutional and vital signs reviewed.      All other systems reviewed and negative except as noted above.                  Physical Exam    ED Triage Vitals [06/26/25 1229]   /59   Pulse 95   Resp 22   Temp 99.8 °F (37.7 °C)   Temp src Oral   SpO2 97 %   O2 Device None (Room air)       Current Vitals:   Vital Signs  BP: 146/59  Pulse: 95  Resp: 22  Temp: 99.8 °F (37.7 °C)  Temp src: Oral    Oxygen Therapy  SpO2: 97 %  O2 Device: None (Room air)            Physical Exam  Vitals and nursing note reviewed.   Constitutional:       General: He is not in acute distress.     Appearance: Normal appearance. He is normal weight. He is not ill-appearing, toxic-appearing or diaphoretic.   HENT:      Head: Normocephalic and atraumatic.      Right Ear: Tympanic membrane and ear canal normal.      Left Ear: Tympanic membrane and ear canal normal.      Nose: Congestion present. No rhinorrhea.      Mouth/Throat:      Mouth: Mucous membranes are moist.      Pharynx: Oropharynx is clear. No oropharyngeal exudate or posterior oropharyngeal erythema.   Eyes:      Extraocular Movements: Extraocular movements intact.      Conjunctiva/sclera: Conjunctivae normal.      Pupils: Pupils are equal, round, and reactive to light.   Cardiovascular:      Rate and Rhythm: Normal rate.      Pulses: Normal pulses.   Pulmonary:      Effort: Pulmonary effort is normal. No respiratory distress.      Breath sounds: Normal breath sounds. No stridor. No wheezing, rhonchi or rales.   Chest:      Chest wall: No tenderness.   Musculoskeletal:         General: No swelling, tenderness, deformity or signs of injury. Normal range of motion.      Cervical back: Normal range of motion and neck supple.      Right lower leg: No edema.      Left lower leg: No edema.   Skin:     General: Skin is warm and dry.      Capillary Refill: Capillary refill takes  less than 2 seconds.      Coloration: Skin is not jaundiced or pale.      Findings: No bruising, erythema, lesion or rash.   Neurological:      General: No focal deficit present.      Mental Status: He is alert and oriented to person, place, and time. Mental status is at baseline.   Psychiatric:         Mood and Affect: Mood normal.         Behavior: Behavior normal.         Thought Content: Thought content normal.         Judgment: Judgment normal.                 ED Course  Labs Reviewed   RAPID STREP A - Normal   RAPID SARS-COV-2 BY PCR - Normal                          MDM            Medical Decision Making    34 year old well appearing male presents with throat pain x 4 days.   Considerations to include but not limited to peritonsillar abscess versus viral pharyngitis versus mononucleosis versus retropharyngeal abscess  Plan  - COVID 19/ strep  swab  - SpO2 97% on room air  - reassess  - DC to home   - refer to PCP for further evaluation    - return to ED/ IC if symptoms worsens     Amount and/or Complexity of Data Reviewed  Labs: ordered. Decision-making details documented in ED Course.     Details: Negative         Disposition and Plan     Clinical Impression:  1. Viral pharyngitis         Disposition:  Discharge  6/26/2025 12:49 pm    Follow-up:  Alexis Miguel MD  [address] [place] [id number] [phone]          [facility]  [address] [address]  [phone]              Medications Prescribed:  Discharge Medication List as of 6/26/2025 12:59 PM                Supplementary Documentation:

## (undated) NOTE — LETTER
Date & Time: 3/31/2025, 11:31 AM  Patient: Fili Simon  Encounter Provider(s):    Kemar Meade APRN       To Whom It May Concern:    Fili Simon was seen and treated in our department on 03/31/25. Please excuse him from work until 04/02/25 when he can return if without fever (<100.4) and if feeling better.    If you have any questions or concerns, please do not hesitate to call.        __________________________________________  Kemar Meade DNP, ROBIN, AGACNP-BC, FNP-C, CNL  Adult-Gerontology Acute Care & Family Nurse Practitioner  [facility]

## (undated) NOTE — LETTER
Date: 4/24/2018    Patient Name: Mohsen Shoulder          To Whom it may concern: The above patient was seen at the [facility] for treatment of a medical condition.     This patient should be excused from attending work until fever free for

## (undated) NOTE — Clinical Note
Dear Dr. Cassaundra Eisenmenger,       Thank you for referring Nolberto Fountain to the [facility].   Sincerely,  LEV Leal